# Patient Record
Sex: FEMALE | Race: WHITE | NOT HISPANIC OR LATINO | Employment: STUDENT | URBAN - METROPOLITAN AREA
[De-identification: names, ages, dates, MRNs, and addresses within clinical notes are randomized per-mention and may not be internally consistent; named-entity substitution may affect disease eponyms.]

---
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
## 2021-04-03 ENCOUNTER — HEALTH MAINTENANCE LETTER (OUTPATIENT)
Age: 21
End: 2021-04-03

## 2021-09-18 ENCOUNTER — HEALTH MAINTENANCE LETTER (OUTPATIENT)
Age: 21
End: 2021-09-18

## 2022-04-30 ENCOUNTER — HEALTH MAINTENANCE LETTER (OUTPATIENT)
Age: 22
End: 2022-04-30

## 2022-06-21 ENCOUNTER — TELEPHONE (OUTPATIENT)
Dept: FAMILY MEDICINE | Facility: CLINIC | Age: 22
End: 2022-06-21

## 2022-06-21 NOTE — TELEPHONE ENCOUNTER
I guess I do not know this patient.  Maybe triage need to evaluate what is going on.    If this is something that can wait till July, then please check the available schedule for any provider who can accommodate the patient.

## 2022-06-21 NOTE — TELEPHONE ENCOUNTER
Pt's mother, Barbara, calling to ask for an appointment for a medication check for pt. Pt has never been seen at [facility] and is transitioning from [facility]. Pt's mother is requesting July 6th (anytime) because it is the only day that pt will be [place]-town. Pt prefers to see a female provider.     Routing to female providers to ask if ok to use Same-Day slot or Hosp f/u slot to see pt.    Team to call pt's mother back after provider advises.   Ph: [phone], ok to leave detailed message     Sussy Childs,  [facility]

## 2022-06-22 NOTE — TELEPHONE ENCOUNTER
Called pt's mother, Barbara, to make an appt to establish care/physical for pt on 7/6/22. No answer, left voice message for Barbara to return call.    Sussy Childs,  [facility]

## 2022-06-22 NOTE — TELEPHONE ENCOUNTER
Called pt's mother, Barbara, to make appt for pt. Annual physical appt scheduled for Wednesday 7/6/22 at 10:40am (check-in) with Adelaida Flaherty in same-day slot.    Sussy Childs,  [facility]

## 2022-06-22 NOTE — TELEPHONE ENCOUNTER
Please call the patient and schedule Annual physical / Establish care which she is due at this time, to further take care of her medical conditions and medications.     Thank you,  Richelle Huff MD on 6/21/2022

## 2022-06-22 NOTE — TELEPHONE ENCOUNTER
Ok to use same day slot with any female provider on 7/6/2022 for annual physical/establish care visit.

## 2022-07-06 ENCOUNTER — OFFICE VISIT (OUTPATIENT)
Dept: FAMILY MEDICINE | Facility: CLINIC | Age: 22
End: 2022-07-06
Payer: COMMERCIAL

## 2022-07-06 VITALS
RESPIRATION RATE: 16 BRPM | BODY MASS INDEX: 22.6 KG/M2 | HEART RATE: 68 BPM | TEMPERATURE: 97.5 F | OXYGEN SATURATION: 97 % | WEIGHT: 144 LBS | SYSTOLIC BLOOD PRESSURE: 109 MMHG | DIASTOLIC BLOOD PRESSURE: 72 MMHG | HEIGHT: 67 IN

## 2022-07-06 DIAGNOSIS — Z12.4 CERVICAL CANCER SCREENING: ICD-10-CM

## 2022-07-06 DIAGNOSIS — Z11.3 SCREENING FOR STDS (SEXUALLY TRANSMITTED DISEASES): ICD-10-CM

## 2022-07-06 DIAGNOSIS — Z00.00 ROUTINE GENERAL MEDICAL EXAMINATION AT A HEALTH CARE FACILITY: Primary | ICD-10-CM

## 2022-07-06 DIAGNOSIS — B96.89 BV (BACTERIAL VAGINOSIS): ICD-10-CM

## 2022-07-06 DIAGNOSIS — N76.0 BV (BACTERIAL VAGINOSIS): ICD-10-CM

## 2022-07-06 DIAGNOSIS — F41.1 GAD (GENERALIZED ANXIETY DISORDER): ICD-10-CM

## 2022-07-06 DIAGNOSIS — Z29.89 ENCOUNTER FOR ALTITUDE SICKNESS PROPHYLAXIS: ICD-10-CM

## 2022-07-06 PROBLEM — F41.9 ANXIETY: Status: ACTIVE | Noted: 2018-07-05

## 2022-07-06 LAB
CLUE CELLS: PRESENT
TRICHOMONAS, WET PREP: ABNORMAL
WBC'S/HIGH POWER FIELD, WET PREP: ABNORMAL
YEAST, WET PREP: ABNORMAL

## 2022-07-06 PROCEDURE — 87210 SMEAR WET MOUNT SALINE/INK: CPT | Performed by: PHYSICIAN ASSISTANT

## 2022-07-06 PROCEDURE — 86803 HEPATITIS C AB TEST: CPT | Performed by: PHYSICIAN ASSISTANT

## 2022-07-06 PROCEDURE — 99213 OFFICE O/P EST LOW 20 MIN: CPT | Mod: 25 | Performed by: PHYSICIAN ASSISTANT

## 2022-07-06 PROCEDURE — 36415 COLL VENOUS BLD VENIPUNCTURE: CPT | Performed by: PHYSICIAN ASSISTANT

## 2022-07-06 PROCEDURE — G0124 SCREEN C/V THIN LAYER BY MD: HCPCS | Performed by: PATHOLOGY

## 2022-07-06 PROCEDURE — G0145 SCR C/V CYTO,THINLAYER,RESCR: HCPCS | Performed by: PHYSICIAN ASSISTANT

## 2022-07-06 PROCEDURE — 87389 HIV-1 AG W/HIV-1&-2 AB AG IA: CPT | Performed by: PHYSICIAN ASSISTANT

## 2022-07-06 PROCEDURE — 86780 TREPONEMA PALLIDUM: CPT | Performed by: PHYSICIAN ASSISTANT

## 2022-07-06 PROCEDURE — 87491 CHLMYD TRACH DNA AMP PROBE: CPT | Performed by: PHYSICIAN ASSISTANT

## 2022-07-06 PROCEDURE — 99385 PREV VISIT NEW AGE 18-39: CPT | Performed by: PHYSICIAN ASSISTANT

## 2022-07-06 PROCEDURE — 87591 N.GONORRHOEAE DNA AMP PROB: CPT | Performed by: PHYSICIAN ASSISTANT

## 2022-07-06 RX ORDER — ONDANSETRON 4 MG/1
4 TABLET, FILM COATED ORAL EVERY 6 HOURS PRN
COMMUNITY
Start: 2022-05-07 | End: 2022-07-06

## 2022-07-06 RX ORDER — ONDANSETRON 4 MG/1
4 TABLET, FILM COATED ORAL EVERY 6 HOURS PRN
Qty: 30 TABLET | Refills: 1 | Status: SHIPPED | OUTPATIENT
Start: 2022-07-06 | End: 2022-08-30

## 2022-07-06 RX ORDER — LEVONORGESTREL 19.5 MG/1
INTRAUTERINE DEVICE INTRAUTERINE
COMMUNITY
Start: 2020-08-10 | End: 2023-03-29

## 2022-07-06 RX ORDER — METRONIDAZOLE 7.5 MG/G
GEL VAGINAL
Qty: 70 G | Refills: 0 | Status: SHIPPED | OUTPATIENT
Start: 2022-07-06 | End: 2022-07-10

## 2022-07-06 RX ORDER — HYDROXYZINE HYDROCHLORIDE 10 MG/1
10 TABLET, FILM COATED ORAL 3 TIMES DAILY PRN
COMMUNITY
Start: 2021-09-15

## 2022-07-06 RX ORDER — AMITRIPTYLINE HYDROCHLORIDE 10 MG/1
10 TABLET ORAL AT BEDTIME
COMMUNITY
Start: 2021-12-07 | End: 2023-03-31

## 2022-07-06 ASSESSMENT — ENCOUNTER SYMPTOMS
HEMATURIA: 0
BREAST MASS: 0
PALPITATIONS: 0
ARTHRALGIAS: 0
COUGH: 0
CHILLS: 0
CONSTIPATION: 0
NERVOUS/ANXIOUS: 0
ABDOMINAL PAIN: 0
DIZZINESS: 0
HEADACHES: 0
FEVER: 0
SORE THROAT: 0
HEARTBURN: 0
EYE PAIN: 0
PARESTHESIAS: 0
SHORTNESS OF BREATH: 0
WEAKNESS: 0
MYALGIAS: 0
DYSURIA: 0
HEMATOCHEZIA: 0
NAUSEA: 1
FREQUENCY: 0
DIARRHEA: 0
JOINT SWELLING: 0

## 2022-07-06 ASSESSMENT — PAIN SCALES - GENERAL: PAINLEVEL: NO PAIN (0)

## 2022-07-06 NOTE — PROGRESS NOTES
SUBJECTIVE:   CC: Evelin Thurman is an 22 year old woman who presents for preventive health visit.       Patient has been advised of split billing requirements and indicates understanding: Yes  Healthy Habits:     Getting at least 3 servings of Calcium per day:  Yes    Bi-annual eye exam:  NO    Dental care twice a year:  NO    Sleep apnea or symptoms of sleep apnea:  Daytime drowsiness    Diet:  Vegetarian/vegan    Frequency of exercise:  2-3 days/week    Duration of exercise:  45-60 minutes    Taking medications regularly:  Yes    Medication side effects:  None    PHQ-2 Total Score: 2    Additional concerns today:  No    - Requests refills of Zofran. Has international travel coming up and gets altitude sickness, Zofran helps  - Needs refills of fluoxetine, SEN stable  - Treated for BV several months ago and has had intermittent spotting since. Patient has light periods 2/2 IUD, but has been spotting between menses.    Today's PHQ-2 Score:   PHQ-2 ( 1999 Pfizer) 7/6/2022   Q1: Little interest or pleasure in doing things 1   Q2: Feeling down, depressed or hopeless 1   PHQ-2 Score 2   Q1: Little interest or pleasure in doing things Several days   Q2: Feeling down, depressed or hopeless Several days   PHQ-2 Score 2       Abuse: Current or Past (Physical, Sexual or Emotional) - No  Do you feel safe in your environment? Yes    Have you ever done Advance Care Planning? (For example, a Health Directive, POLST, or a discussion with a medical provider or your loved ones about your wishes): No, advance care planning information given to patient to review.  Patient declined advance care planning discussion at this time.    Social History     Tobacco Use     Smoking status: Never Smoker     Smokeless tobacco: Never Used   Substance Use Topics     Alcohol use: Never         Alcohol Use 7/6/2022   Prescreen: >3 drinks/day or >7 drinks/week? No     Reviewed orders with patient.  Reviewed health maintenance and updated orders  accordingly - Yes  BP Readings from Last 3 Encounters:   07/06/22 109/72    Wt Readings from Last 3 Encounters:   07/06/22 65.3 kg (144 lb)                  Patient Active Problem List   Diagnosis     Anxiety     History reviewed. No pertinent surgical history.    Social History     Tobacco Use     Smoking status: Never Smoker     Smokeless tobacco: Never Used   Substance Use Topics     Alcohol use: Never     Family History   Problem Relation Age of Onset     No Known Problems Mother      No Known Problems Father      Diabetes Paternal Grandfather      Brain Cancer Cousin            Breast Cancer Screening:        History of abnormal Pap smear: NO - age 21-29 PAP every 3 years recommended     Reviewed and updated as needed this visit by clinical staff   Tobacco  Allergies  Meds  Problems  Med Hx  Surg Hx  Fam Hx            Reviewed and updated as needed this visit by Provider   Tobacco  Allergies  Meds  Problems  Med Hx  Surg Hx  Fam Hx               Review of Systems   Constitutional: Negative for chills and fever.   HENT: Negative for congestion, ear pain, hearing loss and sore throat.    Eyes: Negative for pain and visual disturbance.   Respiratory: Negative for cough and shortness of breath.    Cardiovascular: Negative for chest pain, palpitations and peripheral edema.   Gastrointestinal: Positive for nausea. Negative for abdominal pain, constipation, diarrhea, heartburn and hematochezia.   Breasts:  Negative for tenderness, breast mass and discharge.   Genitourinary: Positive for vaginal bleeding and vaginal discharge. Negative for dysuria, frequency, genital sores, hematuria, pelvic pain and urgency.   Musculoskeletal: Negative for arthralgias, joint swelling and myalgias.   Skin: Negative for rash.   Neurological: Negative for dizziness, weakness, headaches and paresthesias.   Psychiatric/Behavioral: Negative for mood changes. The patient is not nervous/anxious.           OBJECTIVE:   /72   " Pulse 68   Temp 97.5  F (36.4  C) (Tympanic)   Resp 16   Ht 1.695 m (5' 6.73\")   Wt 65.3 kg (144 lb)   SpO2 97%   BMI 22.74 kg/m    Physical Exam  GENERAL: healthy, alert and no distress  EYES: Eyes grossly normal to inspection, PERRL and conjunctivae and sclerae normal  HENT: ear canals and TM's normal, nose and mouth without ulcers or lesions  NECK: no adenopathy, no asymmetry, masses, or scars and thyroid normal to palpation  RESP: lungs clear to auscultation - no rales, rhonchi or wheezes  BREAST: normal without masses, tenderness or nipple discharge and no palpable axillary masses or adenopathy  CV: regular rate and rhythm, normal S1 S2, no S3 or S4, no murmur, click or rub, no peripheral edema and peripheral pulses strong   (female): normal female external genitalia, normal urethral meatus, vaginal mucosa pink, moist, well rugated, and normal cervix/adnexa/uterus without masses or discharge. IUD strings visible at os.  MS: no gross musculoskeletal defects noted, no edema  SKIN: no suspicious lesions or rashes  NEURO: Normal strength and tone, mentation intact and speech normal  PSYCH: mentation appears normal, affect normal/bright    Diagnostic Test Results:  Results for orders placed or performed in visit on 07/06/22 (from the past 24 hour(s))   Wet prep - Clinic Collect    Specimen: Vagina; Swab   Result Value Ref Range    Trichomonas Absent Absent    Yeast Absent Absent    Clue Cells Present (A) Absent    WBCs/high power field None None       ASSESSMENT/PLAN:       ICD-10-CM    1. Routine general medical examination at a health care facility  Z00.00    2. Encounter for altitude sickness prophylaxis  Z29.8 ondansetron (ZOFRAN) 4 MG tablet   3. BV (bacterial vaginosis)  N76.0 metroNIDAZOLE (METROGEL) 0.75 % vaginal gel    B96.89    4. SEN (generalized anxiety disorder)  F41.1 FLUoxetine (PROZAC) 20 MG capsule   5. Screening for STDs (sexually transmitted diseases)  Z11.3 NEISSERIA GONORRHOEA PCR     " "CHLAMYDIA TRACHOMATIS PCR     HIV Antigen Antibody Combo     Hepatitis C Screen Reflex to HCV RNA Quant and Genotype     Treponema Abs w Reflex to RPR and Titer     Wet prep - Clinic Collect     HIV Antigen Antibody Combo     Hepatitis C Screen Reflex to HCV RNA Quant and Genotype     Treponema Abs w Reflex to RPR and Titer   6. Cervical cancer screening  Z12.4 Pap Screen only - recommended age 21 - 24 years     - Zofran and fluoxetine refilled as requested.  - Wet prep positive for BV, rx for Metrogel sent to patient's pharmacy.    COUNSELING:  Reviewed preventive health counseling, as reflected in patient instructions    Estimated body mass index is 22.74 kg/m  as calculated from the following:    Height as of this encounter: 1.695 m (5' 6.73\").    Weight as of this encounter: 65.3 kg (144 lb).        She reports that she has never smoked. She has never used smokeless tobacco.      Counseling Resources:  ATP IV Guidelines  Pooled Cohorts Equation Calculator  Breast Cancer Risk Calculator  BRCA-Related Cancer Risk Assessment: FHS-7 Tool  FRAX Risk Assessment  ICSI Preventive Guidelines  Dietary Guidelines for Americans, 2010  USDA's MyPlate  ASA Prophylaxis  Lung CA Screening    CLAYTON Champion [facility]  "

## 2022-07-07 LAB
C TRACH DNA SPEC QL NAA+PROBE: NEGATIVE
HCV AB SERPL QL IA: NONREACTIVE
HIV 1+2 AB+HIV1 P24 AG SERPL QL IA: NONREACTIVE
N GONORRHOEA DNA SPEC QL NAA+PROBE: NEGATIVE
T PALLIDUM AB SER QL: NONREACTIVE

## 2022-07-11 LAB
BKR LAB AP GYN ADEQUACY: ABNORMAL
BKR LAB AP GYN INTERPRETATION: ABNORMAL
BKR LAB AP HPV REFLEX: NO
BKR LAB AP PREVIOUS ABNORMAL: ABNORMAL
PATH REPORT.COMMENTS IMP SPEC: ABNORMAL
PATH REPORT.COMMENTS IMP SPEC: ABNORMAL
PATH REPORT.RELEVANT HX SPEC: ABNORMAL

## 2022-07-12 ENCOUNTER — PATIENT OUTREACH (OUTPATIENT)
Dept: FAMILY MEDICINE | Facility: CLINIC | Age: 22
End: 2022-07-12

## 2022-07-18 ENCOUNTER — TELEPHONE (OUTPATIENT)
Dept: FAMILY MEDICINE | Facility: CLINIC | Age: 22
End: 2022-07-18

## 2022-07-18 NOTE — TELEPHONE ENCOUNTER
Patient Contact    Attempt # 1    Was call answered?  No.  Left message on voicemail with information to call triage back at [phone], option 2.  Info also left in my chart message    On call back:     Triage pt for my chart sx.    Jeison Son,   Ever since I started the vaginal insert gel for BV I have been having very strange, almost solid discharge. Similar to cottage cheese but more solid and drier. I am wondering if this is normal or if I should be concerned? I also appear to have a UTI, not sure whether that could be related or not. Please let me know your thoughts when you get the chance. Thanks, Evelin RIVERA RN  EP Triage

## 2022-11-19 ENCOUNTER — HEALTH MAINTENANCE LETTER (OUTPATIENT)
Age: 22
End: 2022-11-19

## 2022-12-06 DIAGNOSIS — F41.1 GAD (GENERALIZED ANXIETY DISORDER): ICD-10-CM

## 2022-12-08 NOTE — TELEPHONE ENCOUNTER
Routing refill request to provider for review/approval because:  Drug interaction warning    Rodolfo Murphy RN  [facility] Triage Nurse

## 2023-03-29 PROBLEM — B00.9 HERPES SIMPLEX: Status: ACTIVE | Noted: 2020-08-21

## 2023-03-29 PROBLEM — Z97.5 PRESENCE OF INTRAUTERINE CONTRACEPTIVE DEVICE: Status: ACTIVE | Noted: 2020-04-10

## 2023-03-30 ENCOUNTER — VIRTUAL VISIT (OUTPATIENT)
Dept: INTERNAL MEDICINE | Facility: CLINIC | Age: 23
End: 2023-03-30
Payer: COMMERCIAL

## 2023-03-30 DIAGNOSIS — Z53.9 NO SHOW: Primary | ICD-10-CM

## 2023-03-30 NOTE — PROGRESS NOTES
MA staff unable to get ahold of patient during rooming process but did get ahold of patient's mother who reported to MA that patient is still sleeping and usually sleeps until noon (appoint is scheduled for 11:10 arrival time). Link sent at 11:10 without response. I called patient at 11:21 and no answer. I left  asking her to reschedule. Will sharon as no show to this visit. Patient would likely benefit from scheduling afternoon appointments in the future.    Logan Aguero MD, MPH  [facility]  Internal Medicine

## 2023-03-31 ENCOUNTER — VIRTUAL VISIT (OUTPATIENT)
Dept: FAMILY MEDICINE | Facility: CLINIC | Age: 23
End: 2023-03-31
Payer: COMMERCIAL

## 2023-03-31 DIAGNOSIS — F41.1 GAD (GENERALIZED ANXIETY DISORDER): ICD-10-CM

## 2023-03-31 DIAGNOSIS — R11.0 NAUSEA: Primary | ICD-10-CM

## 2023-03-31 PROBLEM — F41.9 ANXIETY: Status: RESOLVED | Noted: 2018-07-05 | Resolved: 2023-03-31

## 2023-03-31 PROCEDURE — 99214 OFFICE O/P EST MOD 30 MIN: CPT | Mod: VID | Performed by: INTERNAL MEDICINE

## 2023-03-31 RX ORDER — FLUOXETINE 40 MG/1
40 CAPSULE ORAL DAILY
Qty: 90 CAPSULE | Refills: 0 | Status: SHIPPED | OUTPATIENT
Start: 2023-03-31 | End: 2023-07-03

## 2023-03-31 RX ORDER — ONDANSETRON 4 MG/1
TABLET, FILM COATED ORAL
Qty: 30 TABLET | Refills: 0 | Status: SHIPPED | OUTPATIENT
Start: 2023-03-31 | End: 2023-05-22

## 2023-03-31 ASSESSMENT — PATIENT HEALTH QUESTIONNAIRE - PHQ9
SUM OF ALL RESPONSES TO PHQ QUESTIONS 1-9: 13
SUM OF ALL RESPONSES TO PHQ QUESTIONS 1-9: 13
10. IF YOU CHECKED OFF ANY PROBLEMS, HOW DIFFICULT HAVE THESE PROBLEMS MADE IT FOR YOU TO DO YOUR WORK, TAKE CARE OF THINGS AT HOME, OR GET ALONG WITH OTHER PEOPLE: SOMEWHAT DIFFICULT

## 2023-03-31 ASSESSMENT — ANXIETY QUESTIONNAIRES
3. WORRYING TOO MUCH ABOUT DIFFERENT THINGS: MORE THAN HALF THE DAYS
2. NOT BEING ABLE TO STOP OR CONTROL WORRYING: MORE THAN HALF THE DAYS
4. TROUBLE RELAXING: MORE THAN HALF THE DAYS
5. BEING SO RESTLESS THAT IT IS HARD TO SIT STILL: NOT AT ALL
6. BECOMING EASILY ANNOYED OR IRRITABLE: MORE THAN HALF THE DAYS
GAD7 TOTAL SCORE: 13
7. FEELING AFRAID AS IF SOMETHING AWFUL MIGHT HAPPEN: MORE THAN HALF THE DAYS
GAD7 TOTAL SCORE: 13
GAD7 TOTAL SCORE: 13
1. FEELING NERVOUS, ANXIOUS, OR ON EDGE: NEARLY EVERY DAY
7. FEELING AFRAID AS IF SOMETHING AWFUL MIGHT HAPPEN: MORE THAN HALF THE DAYS
8. IF YOU CHECKED OFF ANY PROBLEMS, HOW DIFFICULT HAVE THESE MADE IT FOR YOU TO DO YOUR WORK, TAKE CARE OF THINGS AT HOME, OR GET ALONG WITH OTHER PEOPLE?: SOMEWHAT DIFFICULT
IF YOU CHECKED OFF ANY PROBLEMS ON THIS QUESTIONNAIRE, HOW DIFFICULT HAVE THESE PROBLEMS MADE IT FOR YOU TO DO YOUR WORK, TAKE CARE OF THINGS AT HOME, OR GET ALONG WITH OTHER PEOPLE: SOMEWHAT DIFFICULT

## 2023-03-31 NOTE — PROGRESS NOTES
Evelin is a 23 year old who is being evaluated via a billable video visit.      How would you like to obtain your AVS? MyChart  If the video visit is dropped, the invitation should be resent by: Text to cell phone: [phone]  Will anyone else be joining your video visit? No        Assessment & Plan     1.  Generalized anxiety disorder with increased symptoms.  Discussed treatment option which include increasing dose of fluoxetine versus trying a new SSRI.  After discussion agreed to increase fluoxetine from 20 mg once a day to 40 mg daily.  Advised her that she establish care with a primary provider close to where she lives.  2.  Nausea with severe anxiety.  Patient requested refill of Zofran which I agreed to provide 30 tablets.             Depression Screening Follow Up    PHQ 3/31/2023   PHQ-9 Total Score 13   Q9: Thoughts of better off dead/self-harm past 2 weeks Not at all           Gera Joseph MD  [facility]   Evelin is a 23 year old, presenting for the following health issues:  No chief complaint on file.    Additional Questions 3/31/2023   Roomed by Hardy     History of Present Illness       Mental Health Follow-up:  Patient presents to follow-up on Anxiety.    Patient's anxiety since last visit has been:  Worse  The patient is not having other symptoms associated with anxiety.  Any significant life events: No  Patient is feeling anxious or having panic attacks.  Patient has no concerns about alcohol or drug use.     Today's PHQ-9         PHQ-9 Total Score: 13    PHQ-9 Q9 Thoughts of better off dead/self-harm past 2 weeks :   Not at all    How difficult have these problems made it for you to do your work, take care of things at home, or get along with other people: Somewhat difficult  Today's SEN-7 Score: 13       Medication Followup of Fluoxtine    Taking Medication as prescribed: yes    Side Effects:  None    Medication Helping Symptoms:  NO    23-year-old young   has been having increased generalized anxiety for the past few months.  She has a long history of anxiety disorder and being on fluoxetine 20 mg a day since age 17.  When questioned about depression she indicated that her mood is probably little low as well.  When she gets severe anxiety at times she will get nausea as well and has taken Zofran on a as needed basis.  She is requesting a refill of Zofran.  Currently she does not have an established PCP.        She graduated from from college with environmental science and [employer] last year.  She then went on a backpacking trip to several countries.  She is starting a new job in June.      Review of Systems   Constitutional, HEENT, cardiovascular, pulmonary, gi and gu systems are negative, except as otherwise noted.      Objective           Vitals:  No vitals were obtained today due to virtual visit.    Physical Exam   GENERAL: Healthy, alert and no distress  EYES: Eyes grossly normal to inspection.  No discharge or erythema, or obvious scleral/conjunctival abnormalities.  RESP: No audible wheeze, cough, or visible cyanosis.  No visible retractions or increased work of breathing.    SKIN: Visible skin clear. No significant rash, abnormal pigmentation or lesions.  NEURO: Cranial nerves grossly intact.  Mentation and speech appropriate for age.  PSYCH: Mentation appears normal, affect normal/bright, judgement and insight intact, normal speech and appearance well-groomed.                Video-Visit Details    Type of service:  Video Visit     Originating Location (pt. Location): Home  Distant Location (provider location):  On-site  Platform used for Video Visit: Jess

## 2023-05-24 ENCOUNTER — VIRTUAL VISIT (OUTPATIENT)
Dept: FAMILY MEDICINE | Facility: CLINIC | Age: 23
End: 2023-05-24
Payer: COMMERCIAL

## 2023-05-24 DIAGNOSIS — G44.219 EPISODIC TENSION-TYPE HEADACHE, NOT INTRACTABLE: ICD-10-CM

## 2023-05-24 DIAGNOSIS — R41.840 ATTENTION DEFICIT: Primary | ICD-10-CM

## 2023-05-24 PROCEDURE — 99214 OFFICE O/P EST MOD 30 MIN: CPT | Mod: VID | Performed by: PHYSICIAN ASSISTANT

## 2023-05-24 RX ORDER — METHOCARBAMOL 500 MG/1
500 TABLET, FILM COATED ORAL 3 TIMES DAILY PRN
Qty: 45 TABLET | Refills: 1 | Status: SHIPPED | OUTPATIENT
Start: 2023-05-24 | End: 2024-07-29

## 2023-06-06 ENCOUNTER — PATIENT OUTREACH (OUTPATIENT)
Dept: CARE COORDINATION | Facility: CLINIC | Age: 23
End: 2023-06-06
Payer: COMMERCIAL

## 2023-06-20 ENCOUNTER — PATIENT OUTREACH (OUTPATIENT)
Dept: FAMILY MEDICINE | Facility: CLINIC | Age: 23
End: 2023-06-20
Payer: COMMERCIAL

## 2023-06-20 ENCOUNTER — PATIENT OUTREACH (OUTPATIENT)
Dept: CARE COORDINATION | Facility: CLINIC | Age: 23
End: 2023-06-20
Payer: COMMERCIAL

## 2023-06-20 DIAGNOSIS — R87.610 ATYPICAL SQUAMOUS CELLS OF UNDETERMINED SIGNIFICANCE (ASCUS) ON PAPANICOLAOU SMEAR OF CERVIX: ICD-10-CM

## 2023-06-29 DIAGNOSIS — F41.1 GAD (GENERALIZED ANXIETY DISORDER): ICD-10-CM

## 2023-07-03 RX ORDER — FLUOXETINE 40 MG/1
CAPSULE ORAL
Qty: 90 CAPSULE | Refills: 0 | Status: SHIPPED | OUTPATIENT
Start: 2023-07-03 | End: 2023-10-25

## 2023-09-10 ENCOUNTER — HEALTH MAINTENANCE LETTER (OUTPATIENT)
Age: 23
End: 2023-09-10

## 2023-10-25 ENCOUNTER — OFFICE VISIT (OUTPATIENT)
Dept: FAMILY MEDICINE | Facility: CLINIC | Age: 23
End: 2023-10-25
Payer: COMMERCIAL

## 2023-10-25 VITALS
BODY MASS INDEX: 23.88 KG/M2 | WEIGHT: 148.6 LBS | DIASTOLIC BLOOD PRESSURE: 75 MMHG | TEMPERATURE: 97.7 F | RESPIRATION RATE: 17 BRPM | HEART RATE: 70 BPM | SYSTOLIC BLOOD PRESSURE: 114 MMHG | OXYGEN SATURATION: 99 % | HEIGHT: 66 IN

## 2023-10-25 DIAGNOSIS — Z00.00 ROUTINE HISTORY AND PHYSICAL EXAMINATION OF ADULT: Primary | ICD-10-CM

## 2023-10-25 DIAGNOSIS — Z23 ENCOUNTER FOR IMMUNIZATION: ICD-10-CM

## 2023-10-25 DIAGNOSIS — Z11.3 SCREENING FOR STDS (SEXUALLY TRANSMITTED DISEASES): ICD-10-CM

## 2023-10-25 DIAGNOSIS — R11.0 NAUSEA: ICD-10-CM

## 2023-10-25 DIAGNOSIS — Z12.4 CERVICAL CANCER SCREENING: ICD-10-CM

## 2023-10-25 DIAGNOSIS — Z13.6 CARDIOVASCULAR SCREENING; LDL GOAL LESS THAN 160: ICD-10-CM

## 2023-10-25 DIAGNOSIS — Z13.1 SCREENING FOR DIABETES MELLITUS: ICD-10-CM

## 2023-10-25 DIAGNOSIS — F41.1 GAD (GENERALIZED ANXIETY DISORDER): ICD-10-CM

## 2023-10-25 LAB
ALBUMIN SERPL BCG-MCNC: 4.6 G/DL (ref 3.5–5.2)
ALP SERPL-CCNC: 67 U/L (ref 35–104)
ALT SERPL W P-5'-P-CCNC: 21 U/L (ref 0–50)
ANION GAP SERPL CALCULATED.3IONS-SCNC: 9 MMOL/L (ref 7–15)
AST SERPL W P-5'-P-CCNC: 24 U/L (ref 0–45)
BILIRUB SERPL-MCNC: 0.5 MG/DL
BUN SERPL-MCNC: 14.5 MG/DL (ref 6–20)
C TRACH DNA SPEC QL NAA+PROBE: NEGATIVE
CALCIUM SERPL-MCNC: 9.8 MG/DL (ref 8.6–10)
CHLORIDE SERPL-SCNC: 102 MMOL/L (ref 98–107)
CHOLEST SERPL-MCNC: 159 MG/DL
CREAT SERPL-MCNC: 0.71 MG/DL (ref 0.51–0.95)
DEPRECATED HCO3 PLAS-SCNC: 27 MMOL/L (ref 22–29)
EGFRCR SERPLBLD CKD-EPI 2021: >90 ML/MIN/1.73M2
GLUCOSE SERPL-MCNC: 69 MG/DL (ref 70–99)
HDLC SERPL-MCNC: 56 MG/DL
LDLC SERPL CALC-MCNC: 68 MG/DL
N GONORRHOEA DNA SPEC QL NAA+PROBE: NEGATIVE
NONHDLC SERPL-MCNC: 103 MG/DL
POTASSIUM SERPL-SCNC: 4.3 MMOL/L (ref 3.4–5.3)
PROT SERPL-MCNC: 7.5 G/DL (ref 6.4–8.3)
SODIUM SERPL-SCNC: 138 MMOL/L (ref 135–145)
TRIGL SERPL-MCNC: 173 MG/DL

## 2023-10-25 PROCEDURE — 87491 CHLMYD TRACH DNA AMP PROBE: CPT | Performed by: FAMILY MEDICINE

## 2023-10-25 PROCEDURE — 90480 ADMN SARSCOV2 VAC 1/ONLY CMP: CPT | Performed by: FAMILY MEDICINE

## 2023-10-25 PROCEDURE — 80061 LIPID PANEL: CPT | Performed by: FAMILY MEDICINE

## 2023-10-25 PROCEDURE — 90471 IMMUNIZATION ADMIN: CPT | Performed by: FAMILY MEDICINE

## 2023-10-25 PROCEDURE — G0145 SCR C/V CYTO,THINLAYER,RESCR: HCPCS | Performed by: FAMILY MEDICINE

## 2023-10-25 PROCEDURE — 99395 PREV VISIT EST AGE 18-39: CPT | Mod: 25 | Performed by: FAMILY MEDICINE

## 2023-10-25 PROCEDURE — 87591 N.GONORRHOEAE DNA AMP PROB: CPT | Performed by: FAMILY MEDICINE

## 2023-10-25 PROCEDURE — 80053 COMPREHEN METABOLIC PANEL: CPT | Performed by: FAMILY MEDICINE

## 2023-10-25 PROCEDURE — 90686 IIV4 VACC NO PRSV 0.5 ML IM: CPT | Performed by: FAMILY MEDICINE

## 2023-10-25 PROCEDURE — 91320 SARSCV2 VAC 30MCG TRS-SUC IM: CPT | Performed by: FAMILY MEDICINE

## 2023-10-25 PROCEDURE — 36415 COLL VENOUS BLD VENIPUNCTURE: CPT | Performed by: FAMILY MEDICINE

## 2023-10-25 RX ORDER — FLUOXETINE 40 MG/1
40 CAPSULE ORAL DAILY
Qty: 90 CAPSULE | Refills: 3 | Status: SHIPPED | OUTPATIENT
Start: 2023-10-25 | End: 2024-07-29

## 2023-10-25 RX ORDER — ONDANSETRON 4 MG/1
TABLET, FILM COATED ORAL
Qty: 30 TABLET | Refills: 5 | Status: SHIPPED | OUTPATIENT
Start: 2023-10-25 | End: 2024-07-29

## 2023-10-25 ASSESSMENT — ENCOUNTER SYMPTOMS
FEVER: 0
HEADACHES: 0
ARTHRALGIAS: 0
DIARRHEA: 0
SHORTNESS OF BREATH: 0
ABDOMINAL PAIN: 0
PALPITATIONS: 0
CONSTIPATION: 0
HEARTBURN: 0
PARESTHESIAS: 0
FREQUENCY: 0
SORE THROAT: 0
MYALGIAS: 0
HEMATOCHEZIA: 0
DIZZINESS: 0
DYSURIA: 0
EYE PAIN: 0
NAUSEA: 0
HEMATURIA: 0
JOINT SWELLING: 0
WEAKNESS: 0
NERVOUS/ANXIOUS: 0
COUGH: 0
CHILLS: 0

## 2023-10-25 ASSESSMENT — PAIN SCALES - GENERAL: PAINLEVEL: NO PAIN (0)

## 2023-10-25 NOTE — PROGRESS NOTES
SUBJECTIVE:   CC: Evelin is an 23 year old who presents for preventive health visit.       10/25/2023     9:57 AM   Additional Questions   Roomed by Hayde   Accompanied by self       Healthy Habits:     Getting at least 3 servings of Calcium per day:  Yes    Bi-annual eye exam:  NO    Dental care twice a year:  NO    Sleep apnea or symptoms of sleep apnea:  Daytime drowsiness    Diet:  Other    Frequency of exercise:  2-3 days/week    Duration of exercise:  45-60 minutes    Taking medications regularly:  Yes    Medication side effects:  None    Additional concerns today:  Yes        Social History     Tobacco Use    Smoking status: Never    Smokeless tobacco: Never   Substance Use Topics    Alcohol use: Never             10/25/2023     9:56 AM   Alcohol Use   Prescreen: >3 drinks/day or >7 drinks/week? No          No data to display              Reviewed orders with patient.  Reviewed health maintenance and updated orders accordingly - Yes  Lab work is in process  Labs reviewed in EPIC  BP Readings from Last 3 Encounters:   10/25/23 114/75   07/06/22 109/72    Wt Readings from Last 3 Encounters:   10/25/23 67.4 kg (148 lb 9.6 oz)   07/06/22 65.3 kg (144 lb)                  Patient Active Problem List   Diagnosis    Atypical squamous cells of undetermined significance (ASCUS) on Papanicolaou smear of cervix    Herpes simplex    Presence of intrauterine contraceptive device    SEN (generalized anxiety disorder)     No past surgical history on file.    Social History     Tobacco Use    Smoking status: Never    Smokeless tobacco: Never   Substance Use Topics    Alcohol use: Never     Family History   Problem Relation Age of Onset    No Known Problems Mother     No Known Problems Father     Diabetes Paternal Grandfather     Brain Cancer Cousin          Current Outpatient Medications   Medication Sig Dispense Refill    FLUoxetine (PROZAC) 40 MG capsule Take 1 capsule (40 mg) by mouth daily 90 capsule 3    hydrOXYzine  (ATARAX) 10 MG tablet Take 10 mg by mouth 3 times daily as needed      levonorgestrel (MIRENA) 20 MCG/DAY IUD 1 Intra Uterine Device by Intrauterine route once      methocarbamol (ROBAXIN) 500 MG tablet Take 1 tablet (500 mg) by mouth 3 times daily as needed for other (tension headache) 45 tablet 1    ondansetron (ZOFRAN) 4 MG tablet TAKE 1 TABLET(4 MG) BY MOUTH EVERY 6 HOURS AS NEEDED FOR NAUSEA OR VOMITING 30 tablet 5     Allergies   Allergen Reactions    Dairy Digestive GI Disturbance and Other (See Comments)     Inflammation reaction to sinus       Breast Cancer Screening:        History of abnormal Pap smear: YES - other categories - see link Cervical Cytology Screening Guidelines      7/6/2022    11:13 AM   PAP / HPV   PAP Atypical squamous cells of undetermined significance (ASC-US)      Reviewed and updated as needed this visit by clinical staff   Tobacco  Allergies               Reviewed and updated as needed this visit by Provider                     Review of Systems   Constitutional:  Negative for chills and fever.   HENT:  Negative for congestion, ear pain, hearing loss and sore throat.    Eyes:  Negative for pain and visual disturbance.   Respiratory:  Negative for cough and shortness of breath.    Cardiovascular:  Negative for chest pain, palpitations and peripheral edema.   Gastrointestinal:  Negative for abdominal pain, constipation, diarrhea, heartburn, hematochezia and nausea.   Genitourinary:  Negative for dysuria, frequency, genital sores, hematuria and urgency.   Musculoskeletal:  Negative for arthralgias, joint swelling and myalgias.   Skin:  Negative for rash.   Neurological:  Negative for dizziness, weakness, headaches and paresthesias.   Psychiatric/Behavioral:  Negative for mood changes. The patient is not nervous/anxious.      CONSTITUTIONAL: NEGATIVE for fever, chills, change in weight  INTEGUMENTARU/SKIN: NEGATIVE for worrisome rashes, moles or lesions  EYES: NEGATIVE for vision  "changes or irritation  ENT: NEGATIVE for ear, mouth and throat problems  RESP: NEGATIVE for significant cough or SOB  BREAST: NEGATIVE for masses, tenderness or discharge  CV: NEGATIVE for chest pain, palpitations or peripheral edema  GI: NEGATIVE for nausea, abdominal pain, heartburn, or change in bowel habits  : NEGATIVE for unusual urinary or vaginal symptoms. Periods are regular.  MUSCULOSKELETAL: NEGATIVE for significant arthralgias or myalgia  NEURO: NEGATIVE for weakness, dizziness or paresthesias  PSYCHIATRIC: NEGATIVE for changes in mood or affect     OBJECTIVE:   /75 (BP Location: Left arm, Patient Position: Sitting, Cuff Size: Adult Regular)   Pulse 70   Temp 97.7  F (36.5  C) (Oral)   Resp 17   Ht 1.685 m (5' 6.34\")   Wt 67.4 kg (148 lb 9.6 oz)   SpO2 99%   BMI 23.74 kg/m    Physical Exam  GENERAL: healthy, alert and no distress  NECK: no adenopathy, no asymmetry, masses, or scars and thyroid normal to palpation  RESP: lungs clear to auscultation - no rales, rhonchi or wheezes  CV: regular rate and rhythm, normal S1 S2, no S3 or S4, no murmur, click or rub, no peripheral edema and peripheral pulses strong  ABDOMEN: soft, nontender, no hepatosplenomegaly, no masses and bowel sounds normal   (female): normal female external genitalia, normal urethral meatus, vaginal mucosa, normal cervix/adnexa/uterus without masses or discharge  MS: no gross musculoskeletal defects noted, no edema    Diagnostic Test Results:  Labs reviewed in Epic    ASSESSMENT/PLAN:   (Z00.00) Routine history and physical examination of adult  (primary encounter diagnosis)  Comment:   Plan: as below.    (Z13.6) CARDIOVASCULAR SCREENING; LDL GOAL LESS THAN 160  Comment:   Plan: Comprehensive metabolic panel (BMP + Alb, Alk         Phos, ALT, AST, Total. Bili, TP), Lipid panel         reflex to direct LDL Non-fasting            (Z13.1) Screening for diabetes mellitus  Comment:   Plan: Comprehensive metabolic panel (BMP + " Alb, Alk         Phos, ALT, AST, Total. Bili, TP)            (Z11.3) Screening for STDs (sexually transmitted diseases)  Comment:   Plan: NEISSERIA GONORRHOEA PCR, CHLAMYDIA TRACHOMATIS        PCR            (Z12.4) Cervical cancer screening  Comment:   Plan: Pap Screen only - recommended age 21 - 24 years            (F41.1) SEN (generalized anxiety disorder)  Comment:   Plan: FLUoxetine (PROZAC) 40 MG capsule            (R11.0) Nausea  Comment:   Plan: ondansetron (ZOFRAN) 4 MG tablet            (Z23) Encounter for immunization  Comment:   Plan: INFLUENZA VACCINE IM > 6 MONTHS VALENT IIV4         (AFLURIA/FLUZONE), COVID-19 12+ (2023-24)         (PFIZER)            Patient has been advised of split billing requirements and indicates understanding: Yes      COUNSELING:  Reviewed preventive health counseling, as reflected in patient instructions       Regular exercise       Healthy diet/nutrition       Vision screening        She reports that she has never smoked. She has never used smokeless tobacco.        Brett Waterman MD, MD  [facility]

## 2023-10-25 NOTE — COMMUNITY RESOURCES LIST (ENGLISH)
10/25/2023   [facility]  N/A  For questions about this resource list or additional care needs, please contact your primary care clinic or care manager.  Phone: [phone]   Email: N/A   Address: [address] [place] [id number]   Hours: N/A        Food and Nutrition       Food pantry  1  People Reaching Out to Other People (PROP) Distance: 2.5 miles      Pickup   [address] [place] [address]  Language: English, Burkinan  Hours: Mon - Tue 9:30 AM - 1:00 PM , Wed 3:00 PM - 6:30 PM , Thu - Fri 9:30 AM - 1:00 PM  Fees: Free   Phone: (826) 394-3685 Email: prop@[employer] Website: http://www.FSAstore.com.BT[employer] Payments     2  [facility] ([facility]) - K-Tel Distance: 4.82 miles      [facility], Pickup   [address] [place] [address]  Language: English, Surinamese, Burkinan  Hours: Mon 12:00 AM - 7:00 PM , Tue 10:00 AM - 3:00 PM , Wed - Thu 10:00 AM - 2:30 PM  Fees: Free   Phone: (700) 665-6834 Email: ica@[employer].org Website: http://www.[employer].org     SNAP application assistance  3  People Reaching Out to Other People (PROP) Distance: 2.5 miles      In-Person, Phone/Virtual   [address] [place] [address]  Language: English, Burkinan  Hours: Mon - Tue 9:30 AM - 1:00 PM , Wed 3:00 PM - 6:30 PM , Thu - Fri 9:30 AM - 1:00 PM  Fees: Free   Phone: (468) 441-8035 Email: prop@FSA[employer].com.org Website: http://www.FSAstore.com.org     4  [facility] Distance: 11.27 miles      In-Person, Phone/Virtual   [address] [place] [address]  Language: English, Burkinan  Hours: Mon - Fri 9:00 AM - 4:30 PM  Fees: Free   Phone: (230) 479-4960 Ext.113 Email: info@veap.org Website: https://Sosediap.org     Soup kitchen or free meals  5  [facility] Distance: 5.59 miles      [facility]   [address] [place] [address]  Language: English  Hours: Mon - Tue 5:30 PM - 6:30 PM , Sat -  Sun 5:30 PM - 6:30 PM  Fees: Free   Phone: (979) 320-8369 Email: office@[employer] Website: https://www.[employer]     6  [employer] Distance: 6.2 miles      In-Person   [address] [place] [address]  Language: English  Hours: Mon - Fri 2:30 PM - 5:00 PM  Fees: Free   Phone: (707) 469-3080 Email: info@[employer] Website: http://[employer]/          Important Numbers & Websites       Emergency Services   [address]  [place] [address]  Poison Control   (361) 922-2564  Suicide Prevention Lifeline   (144) 419-4363 (TALK)  Child Abuse Hotline   (686) 120-2926 (4-A-Child)  Sexual Assault Hotline   (786) 274-4127 (HOPE)  National Runaway Safeline   (858) 915-1767 (RUNAWAY)  All-Options Talkline   (927) 469-5898  Substance Abuse Referral   (218) 535-1064 (HELP)

## 2023-10-25 NOTE — COMMUNITY RESOURCES LIST (ENGLISH)
10/25/2023   [facility]  N/A  For questions about this resource list or additional care needs, please contact your primary care clinic or care manager.  Phone: [phone]   Email: N/A   Address: [address] [place] [address]   Hours: N/A        Food and Nutrition       Food pantry  1  People Reaching Out to Other People (PROP) Distance: 2.5 miles      Pickup   [address] [place] [address]  Language: English, Danish  Hours: Mon - Tue 9:30 AM - 1:00 PM , Wed 3:00 PM - 6:30 PM , Thu - Fri 9:30 AM - 1:00 PM  Fees: Free   Phone: (785) 209-1336 Email: prop@[employer] Website: http://www.[place].KSE     2  [facility] - K-Tel Distance: 4.82 miles      [facility], Pickup   [address] [place] [address]  Language: English, Icelandic, Danish  Hours: Mon 12:00 AM - 7:00 PM , Tue 10:00 AM - 3:00 PM , Wed - Thu 10:00 AM - 2:30 PM  Fees: Free   Phone: (459) 820-6458 Email: ica@[employer].org Website: http://www.[employer].org     SNAP application assistance  3  People Reaching Out to Other People (PROP) Distance: 2.5 miles      In-Person, Phone/Virtual   [address] [place] [address]  Language: English, Danish  Hours: Mon - Tue 9:30 AM - 1:00 PM , Wed 3:00 PM - 6:30 PM , Thu - Fri 9:30 AM - 1:00 PM  Fees: Free   Phone: (160) 569-4818 Email: prop@[employer].org Website: http://www.[employer].org     4  [facility] Distance: 11.27 miles      In-Person, Phone/Virtual   [address] [place] [address]  Language: English, Danish  Hours: Mon - Fri 9:00 AM - 4:30 PM  Fees: Free   Phone: (177) 693-9913 Ext.113 Email: info@veap.org Website: https://[employer].org     Soup kitchen or free meals  5  [facility] Distance: 5.59 miles      [facility]   [address] [place] [address]  Language: English  Hours: Mon - Tue 5:30 PM - 6:30 PM , Sat -  Sun 5:30 PM - 6:30 PM  Fees: Free   Phone: (898) 620-8792 Email: office@[employer] Website: https://www.[employer]     6  [place] [facility] Distance: 6.2 miles      In-Person   [address] [place] [address]  Language: English  Hours: Mon - Fri 2:30 PM - 5:00 PM  Fees: Free   Phone: (713) 696-2694 Email: info@[employer] Website: http://[employer]/          Important Numbers & Websites       Emergency Services   [address]  [place] [address]  Poison Control   (141) 989-7026  Suicide Prevention Lifeline   (717) 726-5507 (TALK)  Child Abuse Hotline   (297) 484-6686 (4-A-Child)  Sexual Assault Hotline   (552) 718-9606 (HOPE)  National Runaway Safeline   (465) 427-9140 (RUNAWAY)  All-Options Talkline   (254) 687-8962  Substance Abuse Referral   (570) 212-6147 (HELP)

## 2023-10-30 ENCOUNTER — PATIENT OUTREACH (OUTPATIENT)
Dept: FAMILY MEDICINE | Facility: CLINIC | Age: 23
End: 2023-10-30
Payer: COMMERCIAL

## 2023-10-30 LAB
BKR LAB AP GYN ADEQUACY: NORMAL
BKR LAB AP GYN INTERPRETATION: NORMAL
BKR LAB AP HPV REFLEX: NO
BKR LAB AP PREVIOUS ABNORMAL: NORMAL
PATH REPORT.COMMENTS IMP SPEC: NORMAL
PATH REPORT.COMMENTS IMP SPEC: NORMAL
PATH REPORT.RELEVANT HX SPEC: NORMAL

## 2023-11-14 ASSESSMENT — ANXIETY QUESTIONNAIRES
7. FEELING AFRAID AS IF SOMETHING AWFUL MIGHT HAPPEN: SEVERAL DAYS
5. BEING SO RESTLESS THAT IT IS HARD TO SIT STILL: NOT AT ALL
3. WORRYING TOO MUCH ABOUT DIFFERENT THINGS: MORE THAN HALF THE DAYS
2. NOT BEING ABLE TO STOP OR CONTROL WORRYING: MORE THAN HALF THE DAYS
GAD7 TOTAL SCORE: 11
1. FEELING NERVOUS, ANXIOUS, OR ON EDGE: MORE THAN HALF THE DAYS
6. BECOMING EASILY ANNOYED OR IRRITABLE: NEARLY EVERY DAY

## 2023-11-14 ASSESSMENT — PATIENT HEALTH QUESTIONNAIRE - PHQ9
10. IF YOU CHECKED OFF ANY PROBLEMS, HOW DIFFICULT HAVE THESE PROBLEMS MADE IT FOR YOU TO DO YOUR WORK, TAKE CARE OF THINGS AT HOME, OR GET ALONG WITH OTHER PEOPLE: SOMEWHAT DIFFICULT
SUM OF ALL RESPONSES TO PHQ QUESTIONS 1-9: 15
5. POOR APPETITE OR OVEREATING: SEVERAL DAYS
SUM OF ALL RESPONSES TO PHQ QUESTIONS 1-9: 15

## 2023-11-15 ENCOUNTER — VIRTUAL VISIT (OUTPATIENT)
Dept: PSYCHOLOGY | Facility: CLINIC | Age: 23
End: 2023-11-15
Attending: PHYSICIAN ASSISTANT
Payer: COMMERCIAL

## 2023-11-15 DIAGNOSIS — R41.840 ATTENTION DEFICIT: ICD-10-CM

## 2023-11-15 DIAGNOSIS — F41.1 GENERALIZED ANXIETY DISORDER: ICD-10-CM

## 2023-11-15 DIAGNOSIS — F33.1 MAJOR DEPRESSIVE DISORDER, RECURRENT EPISODE, MODERATE (H): Primary | ICD-10-CM

## 2023-11-15 DIAGNOSIS — F88 DEVELOPMENTAL MENTAL DISORDER: ICD-10-CM

## 2023-11-15 PROCEDURE — 90832 PSYTX W PT 30 MINUTES: CPT | Mod: 95 | Performed by: PSYCHOLOGIST

## 2023-11-15 ASSESSMENT — COLUMBIA-SUICIDE SEVERITY RATING SCALE - C-SSRS
6. HAVE YOU EVER DONE ANYTHING, STARTED TO DO ANYTHING, OR PREPARED TO DO ANYTHING TO END YOUR LIFE?: NO
ATTEMPT LIFETIME: NO
2. HAVE YOU ACTUALLY HAD ANY THOUGHTS OF KILLING YOURSELF?: NO
1. HAVE YOU WISHED YOU WERE DEAD OR WISHED YOU COULD GO TO SLEEP AND NOT WAKE UP?: NO
TOTAL  NUMBER OF ABORTED OR SELF INTERRUPTED ATTEMPTS LIFETIME: NO
TOTAL  NUMBER OF INTERRUPTED ATTEMPTS LIFETIME: NO

## 2023-11-15 ASSESSMENT — ANXIETY QUESTIONNAIRES: GAD7 TOTAL SCORE: 11

## 2023-11-15 NOTE — PROGRESS NOTES
[facility]   Mental Health & Addiction Services     Progress Note - Initial Visit    Client Name:  Evelin Thurman Date: November 15, 2023         Service Type: Individual     Visit Start Time: 9:00am  Visit End Time: 9:30am    Visit #: 1    Attendees: Client attended alone    Service Modality:  Video Visit:      Provider verified identity through the following two step process.  Patient provided:  Patient  and Patient address    Telemedicine Visit: The patient's condition can be safely assessed and treated via synchronous audio and visual telemedicine encounter.      Reason for Telemedicine Visit: Services only offered telehealth    Originating Site (Patient Location): Patient's home    Distant Site (Provider Location): Provider Remote Setting- Home Office    Consent:  The patient/guardian has verbally consented to: the potential risks and benefits of telemedicine (video visit) versus in person care; bill my insurance or make self-payment for services provided; and responsibility for payment of non-covered services.     Patient would like the video invitation sent by:  Send to e-mail at: [email or web address]    Mode of Communication:  Video Conference via [facility]    Distant Location (Provider):  Off-site    As the provider I attest to compliance with applicable laws and regulations related to telemedicine.       DATA:  Extended Session (53+ minutes): No  Interactive Complexity: No   Crisis: No     Presenting Concerns/Current Stressors:   Patient presented to session to initiate the ADHD evaluation process.           3/31/2023     9:24 AM 2023     1:47 PM   PHQ   PHQ-9 Total Score 13 15   Q9: Thoughts of better off dead/self-harm past 2 weeks Not at all Not at all            3/31/2023     9:24 AM 2023     1:47 PM   SEN-7 SCORE   Total Score 13 (moderate anxiety)    Total Score 13 11       ASSESSMENT:  Mental Status Assessment:  Appearance:   Appropriate   Eye  Contact:   Good   Psychomotor Behavior: Normal   Attitude:   Cooperative   Orientation:   Person  Speech   Rate / Production: Normal/ Responsive   Volume:  Normal   Mood:    Anxious  Depressed   Affect:    Appropriate  Flat   Thought Content:  Clear   Thought Form:  Logical   Insight:    Good       Safety Issues and Plan for Safety and Risk Management:   Garrard Suicide Severity Rating Scale (Lifetime/Recent)      11/15/2023     9:05 AM   Garrard Suicide Severity Rating (Lifetime/Recent)   Q1 Wish to be Dead (Lifetime) N   Q2 Non-Specific Active Suicidal Thoughts (Lifetime) N   Actual Attempt (Lifetime) N   Has subject engaged in non-suicidal self-injurious behavior? (Lifetime) N   Interrupted Attempts (Lifetime) N   Aborted or Self-Interrupted Attempt (Lifetime) N   Preparatory Acts or Behavior (Lifetime) N   Calculated C-SSRS Risk Score (Lifetime/Recent) No Risk Indicated     Patient denies current fears or concerns for personal safety.  Patient denies current or recent suicidal ideation or behaviors.  Patient denies current or recent homicidal ideation or behaviors.  Patient denies current or recent self injurious behavior or ideation.  Patient denies other safety concerns.  Recommended that patient call 911 or go to the local ED should there be a change in any of these risk factors.   Patient reports there are no firearms in the house.    Diagnostic Criteria:  F33.1:  A. Five (or more) symptoms have been present during the same 2-week period and represent a change from previous functioning; at least one of the symptoms is either (1) depressed mood or (2) loss of interest or pleasure.   Depressed mood.   Diminished interest or pleasure in all, or almost all, activities.   Significant appetite change.  Significant sleep change.   Fatigue or loss of energy.   Feelings of worthlessness or inappropriate guilt.   Diminished ability to think or concentrate, or indecisiveness.   Psychomotor agitation or lethargy.    Recurrent thoughts of death.   B. The symptoms cause clinically significant distress or impairment in social, occupational, or other important areas of functioning.  C. The episode is not attributable to the physiological effects of a substance or to another medical condition.  D. The occurence of major depressive episode is not better explained by other thought / psychotic disorders.  E. There has never been a manic episode or hypomanic episode.     F41.1:  A. Excessive anxiety and worry, occurring more days than not for at least 6 months about a number of events or activities.   B. The individual finds it difficult to control the worry.  C. The anxiety and worry are associated with 3 or more of 6 symptoms.  D. The anxiety, worry, or physical symptoms cause clinically significant distress or impairment in social, occupational, or other important areas of functioning.  E. The disturbance is not attributable to the physiological effects of a substance (e.g., a drug of abuse, a medication) or another medical condition (e.g., hyperthyroidism).  F. The disturbance is not better explained by another mental disorder (e.g., anxiety or worry about having panic attacks in panic disorder, negative evaluation in social anxiety disorder [social phobia], contamination or other obsessions in obsessive-compulsive disorder, separation from attachment figures in separation anxiety disorder, reminders of traumatic events in posttraumatic stress disorder, gaining weight in anorexia nervosa, physical complaints in somatic symptom disorder, perceived appearance flaws in body dysmorphic disorder, having a serious illness in illness anxiety disorder, or the content of delusional beliefs in schizophrenia or delusional disorder).    F88:  A. A persistent pattern of inattention and/or hyperactivity-impulsivity that interferes with functioning or development, as characterized by (1) and/or (2):   1. Six or more inattention symptoms that  have persisted for at least 6 months to a degree that is inconsistent with developmental level and that negatively impacts directly on social and academic/occupational activities.   2. Six or more hyperactivity and impulsivity symptoms that have persisted for at least 6 months to a degree that is inconsistent with developmental level and that negatively impacts directly on social and academic/occupational activities.  B. Several symptoms (inattentive or hyperactive/impulsive) were present before the age of 12 years.  C. Several symptoms (inattentive or hyperactive/impulsive) present in ?2 settings (eg, at home, school, or work; with friends or relatives; in other activities).  D. There is clear evidence that the symptoms interfere with or reduce the quality of social, academic, or occupational functioning.  E. Symptoms do not occur exclusively during the course of schizophrenia or another psychotic disorder, and are not better explained by another mental disorder (eg, mood disorder, anxiety disorder, dissociative disorder, personality disorder, substance intoxication, or withdrawal).      DSM5 Diagnoses: (Sustained by DSM5 Criteria Listed Above)  Diagnoses:   1. Major depressive disorder, recurrent episode, moderate (H)    2. Generalized anxiety disorder    3. Developmental mental disorder    4. Attention deficit      Psychosocial & Contextual Factors: social support, partner is long-distance, employed seasonally     PROMIS-10 Scores  Global Mental Health Score: (P) 13  Global Physical Health Score: (P) 14   PROMIS TOTAL - SUBSCORES: (P) 27      Intervention:              Reviewed symptoms and history of presenting concern. Patient endorsed symptoms consistent with depression , anxiety , and ADHD. Patient denied symptoms associated with cookie, panic, OCD, trauma, and perceptual difficulties. Unable to complete diagnostic intake, will be completed in next session.   CBT: socratic questioning, positive  reinforcement  EFT: empathetic attunement, emotion checking, emotion naming  MI: open ended questions, affirmations, reflections        Attendance Agreement:  Client has not signed the attendance agreement. Discussed expectations at beginning of this first session and patient agreed.       PLAN:  Provider will continue Diagnostic Assessment in next session. Patient will complete Mukul questionnaires  and CNS Vital Signs prior to next session (11/29/2023).    Patient meets the following risk assessment and triage: Patient denied any current/recent/lifetime history of suicidal ideation and/or behaviors.  No safety plan indicated at this time.     Medical necessity criteria is warranted in order to: Measure a psychological disorder and its severity and functional impairment to determine psychiatric diagnosis when a mental illness is suspected, or to achieve a differential diagnosis from a range of medical/psychological disorders that present with similar constellations of symptoms (e.g., determination and measurement of anxiety severity and impact in the presence of ongoing asthma or heart disease), Perform symptom measurement to objectively measure treatment effectiveness and/or determine the need to refer for pharmacological treatment or other medical evaluation (e.g., based on severity and chronicity of symptoms), and Evaluate primary symptoms of impaired attention and concentration that can occur in many neurological and psychiatric conditions.    Medical necessity for psychological assessment is warranted as a result of the following: (1) A specific clinical question is posed that relates to the condition/symptoms being addressed (2) The question cannot be adequately addressed by clinical interview and/or behavioral observation (3) Results of psychological testing are reasonably expected to provide an answer to the query (4) It is reasonably expected that the testing will provide information leading to a clearer  diagnosis and/or guide treatment planning with an expectation of improved clinical outcome.    I acknowledge that, based upon current clinical information, the patient and I have reviewed and discussed issues pertaining to the purpose of therapy/testing, potential therapeutic goals, procedures, risks and benefits, and estimated duration of therapy/testing. Issues pertaining to fees/insurance and confidentiality were also addressed with the patient, who indicated understanding and elected to continue with appointments. I will not be providing any experimental procedures and, if we agree that a change in clinical procedure would be more beneficial, I will obtain specific consent for that procedure or refer you to another provider who has expertise in that area.       Katiuska David PsyD, LP  Clinical Psychologist

## 2023-11-28 ASSESSMENT — PATIENT HEALTH QUESTIONNAIRE - PHQ9
SUM OF ALL RESPONSES TO PHQ QUESTIONS 1-9: 13
10. IF YOU CHECKED OFF ANY PROBLEMS, HOW DIFFICULT HAVE THESE PROBLEMS MADE IT FOR YOU TO DO YOUR WORK, TAKE CARE OF THINGS AT HOME, OR GET ALONG WITH OTHER PEOPLE: SOMEWHAT DIFFICULT
SUM OF ALL RESPONSES TO PHQ QUESTIONS 1-9: 13

## 2023-11-29 ENCOUNTER — VIRTUAL VISIT (OUTPATIENT)
Dept: PSYCHOLOGY | Facility: CLINIC | Age: 23
End: 2023-11-29
Payer: COMMERCIAL

## 2023-11-29 DIAGNOSIS — F33.1 MAJOR DEPRESSIVE DISORDER, RECURRENT EPISODE, MODERATE (H): Primary | ICD-10-CM

## 2023-11-29 DIAGNOSIS — F41.1 GENERALIZED ANXIETY DISORDER: ICD-10-CM

## 2023-11-29 DIAGNOSIS — F88 DEVELOPMENTAL MENTAL DISORDER: ICD-10-CM

## 2023-11-29 PROCEDURE — 90791 PSYCH DIAGNOSTIC EVALUATION: CPT | Mod: 95 | Performed by: PSYCHOLOGIST

## 2023-11-29 NOTE — PROGRESS NOTES
[facility]  Provider Name:  Katiuska David     Credentials:  ABE Yu    PATIENT'S NAME: Evelin Thumran  PREFERRED NAME: Evelin  PRONOUNS: she/her  MRN: [id number]  : 2000  ADDRESS: [address] [place]  [place] [address]  ACCT. NUMBER:  [id number]  DATE OF SERVICE: 23  START TIME: 8:00am  END TIME: 8:30am  PREFERRED PHONE: [phone]  SERVICE MODALITY:  Video Visit:      Provider verified identity through the following two step process.  Patient provided:  Patient  and Patient address    Telemedicine Visit: The patient's condition can be safely assessed and treated via synchronous audio and visual telemedicine encounter.      Reason for Telemedicine Visit: Services only offered telehealth    Originating Site (Patient Location): Patient's home    Distant Site (Provider Location): Provider Remote Setting- Home Office    Consent:  The patient/guardian has verbally consented to: the potential risks and benefits of telemedicine (video visit) versus in person care; bill my insurance or make self-payment for services provided; and responsibility for payment of non-covered services.     Patient would like the video invitation sent by:  Send to e-mail at: [email or web address]    Mode of Communication:  Video Conference via [facility]    Distant Location (Provider):  Off-site    As the provider I attest to compliance with applicable laws and regulations related to telemedicine.    UNIVERSAL ADULT Mental Health DIAGNOSTIC ASSESSMENT    Identifying Information:  Patient is a 23 year old, ;  woman. The pronoun use throughout this assessment reflects the patient's chosen pronoun. Patient was referred for an assessment by Sandi Hilliard PA-C. Patient attended the session alone.     Chief Complaint:   Patient reported seeking services at this time for diagnostic assessment and recommendations for treatment. Patient's presenting concerns include: Forgetfulness,  "distractibility, zoning out in conversations, and lack of drive that has all worsened since graduating college. Specifically, the patient reported experiencing the following symptoms: difficulty sustaining attention, problems listening when spoken to directly, not following through with tasks, avoiding tasks that require sustained mental effort, losing things often, being easily distracted, and being forgetful.     Patient reported that she has not been assessed for ADHD in the past. Symptoms reportedly began in more recent years. The patient reported a history of anxiety symptoms that have \"always\" been present.  Indicated depression symptoms began in college.  Is taking her prescribed Prozac to help manage symptoms. Client reported that other professional(s) are involved in providing services, as she was referred by her PCP.    Social/Family History:  Patient reported she grew up in  [place], [place] . Patient was the second born of 2 children. There are no known complications during pregnancy or delivery. This is an intact family and parents remain . Patient reported no difficulty with childhood peer relationships. Reported that childhood was and \"pretty average suburban upbringing.\"  She reported having positive relationships with family members and played sports in her early years.  Denied ADHD symptoms in those environments.  Described her current relationships with family of origin as supportive with frequent communication.      The patient denied a history of learning disorders, special education programming, and receiving tutoring services. Patient reported sustaining 1 head injury at age 13 but denied LOC associated with that event as a child, she reported never struggling in school, was not disruptive in class, and was not distracted.  The patient reported that homework was procrastinated but finished.  She further indicated that her parents and teachers pushed her \"in a good way\" and she was " "participating in the gifted and talented program. Recalled academic strengths in math and science.  The patient's highest education level is college graduate.  After high school, the patient completed college and stated that she \"did pretty well.\"  Did experience significant test anxiety, particularly with physics, so she received academic accommodations to have time and a half for test taking in that subject.  The COVID-19 pandemic began in the patient's second year of college and she reported that she continued to perform well.  Stated that she was able to rewind lectures and take thorough notes on her own time.    The patient describes her cultural background as White, , American.  Cultural influences and impact on patient's life structure, values, norms, and healthcare:  Cartesian .  The patient described that she does keep in touch with some of her  heritage but it is not a core component of her identity. Patient identified her preferred language to be English. Patient reported she does not need the assistance of an  or other support involved in therapy.     Patient is currently in a committed relationship with her partner of about 4 years. Patient identified their sexual orientation as bi-sexual. Patient reported having zero child(lito). Patient identified partner, parents, and friends as part of her support system. Patient identified the quality of these relationships as fair.      Patient reported that she is currently staying with her parents for about 2 months.  Otherwise, is typically living in traveling with her partner.    Patient works seasonally.  During summer months, the patient works as a  in [place] and reported that the position is laid back.  Stated that she is forgetful and her boss has made comments about being slow.  Has a seasonal position as a  and in [place] lined up with this winter.. Patient reports finances are obtained " through employment.     Patient has not served in the .     Patient reported that she has not been involved with the legal system. Patient denies being on probation / parole / under the jurisdiction of the court.    Patient has received a 's license. Patient reported that her partner has made complaints about her driving.    Patient's Strengths and Limitations:  Patient identified the following strengths or resources that will help them succeed in treatment: friends / good social support, family support, insight, intelligence, positive work environment, motivation, strong social skills, and work ethic. Things that may interfere with the patient's success in treatment include: none identified.     Personal and Family Medical History:  Patient reported the following biological family members or relatives with mental health issues: Mother experienced an Anxiety Disorder.  Patient previously received the following mental health diagnosis: an Anxiety Disorder and Depression.   Patient has received the following mental health services in the past: counseling and medication(s) from physician / PCP.   Patient is not currently receiving any mental health services.  Hospitalizations: None.   Previous/Current commitments: None.     Patient has had a physical exam to rule out medical causes for current symptoms. Date of last physical exam was 10/25/2023. The patient reported that she does not have a regular PCP. Patient reported no current medical concerns. Patient denies any issues with pain.. There are not significant appetite/nutritional concerns/weight changes.    Current Outpatient Medications   Medication    FLUoxetine (PROZAC) 40 MG capsule    hydrOXYzine (ATARAX) 10 MG tablet    levonorgestrel (MIRENA) 20 MCG/DAY IUD    methocarbamol (ROBAXIN) 500 MG tablet    ondansetron (ZOFRAN) 4 MG tablet     No current facility-administered medications for this visit.       Client reports taking prescribed medications  as prescribed.    Patient Allergies:   Allergies   Allergen Reactions    Dairy Digestive GI Disturbance and Other (See Comments)     Inflammation reaction to sinus       Medical History:   Past Medical History:   Diagnosis Date    SEN (generalized anxiety disorder) 3/31/2023       Family history includes: family history includes Brain Cancer in her cousin; Diabetes in her paternal grandfather; No Known Problems in her father and mother.    Current Mental Status Exam:   Appearance:  Appropriate    Eye Contact:  Good   Psychomotor:  Normal       Gait / station:  no problem  Attitude / Demeanor: Cooperative   Speech      Rate / Production: Normal/ Responsive      Volume:  Normal  volume      Language:  intact  Mood:   Depressed   Affect:   Appropriate    Thought Content: Clear   Thought Process: Logical       Associations: No loosening of associations  Insight:   Good   Judgment:  Intact   Orientation:  All  Attention/concentration: Good    Rating Scales:  PHQ9:        3/31/2023     9:24 AM 11/14/2023     1:47 PM 11/28/2023     4:07 PM   PHQ-9 SCORE   PHQ-9 Total Score MyChart 13 (Moderate depression) 15 (Moderately severe depression) 13 (Moderate depression)   PHQ-9 Total Score 13 15 13       GAD7:        3/31/2023     9:24 AM 11/14/2023     1:47 PM   SEN-7 SCORE   Total Score 13 (moderate anxiety)    Total Score 13 11       Substance Use:  Patient did report a family history of substance use concerns; see medical history section for details. Patient has not received chemical dependency treatment in the past. Patient has not ever been to detox. Patient is not currently receiving any chemical dependency treatment. Patient reported  no  problems as a result of her substance use.    Alcohol: Patient reported rare use, less than 1 time per month.  Nicotine: None.  Cannabis: Experimentation in the past.  Caffeine: 3-4 caffeinated beverages per week, usually tea.  Street Drugs: None.  Prescription Drugs: None.    CAGE: None  of the patient's responses to the CAGE screening were positive / Negative CAGE score     Substance Use: No symptoms    Based on the negative CAGE score and clinical interview there are not indications of drug or alcohol abuse.    Significant Losses/Trauma/Abuse/Neglect Issues:   There are indications or report of significant loss, trauma, abuse or neglect issues related to: are no indications and client denies any losses, trauma, abuse, or neglect concerns.  Concerns for possible neglect are not present.    Safety Assessment:   [place] Suicide Severity Rating Scale (Lifetime/Recent)[place] Suicide Severity Rating Scale (Lifetime/Recent)      11/15/2023     9:05 AM   [place] Suicide Severity Rating (Lifetime/Recent)   Q1 Wish to be Dead (Lifetime) N   Q2 Non-Specific Active Suicidal Thoughts (Lifetime) N   Actual Attempt (Lifetime) N   Has subject engaged in non-suicidal self-injurious behavior? (Lifetime) N   Interrupted Attempts (Lifetime) N   Aborted or Self-Interrupted Attempt (Lifetime) N   Preparatory Acts or Behavior (Lifetime) N   Calculated C-SSRS Risk Score (Lifetime/Recent) No Risk Indicated     Patient denies current homicidal ideation and behaviors.  Patient denies current self-injurious ideation and behaviors.    Patient denied risk behaviors associated with substance use.  Patient denies any high risk behaviors associated with mental health symptoms.  Patient reports the following current concerns for their personal safety: None.  Patient reports there are not firearms in the house.     History of Safety Concerns:  Patient denied a history of homicidal ideation.     Patient denied a history of personal safety concerns.    Patient denied a history of assaultive behaviors.    Patient denied a history of sexual assault behaviors.     Patient denied a history of risk behaviors associated with substance use.  Patient denies any history of high risk behaviors associated with mental health  symptoms.  Patient reports the following protective factors: forward or future oriented thinking; safe and stable environment; regular sleep; secure attachment; help seeking behaviors when distressed; effective problem solving skills; healthy fear of risky behaviors or pain    Risk Plan:  See Recommendations for Safety and Risk Management Plan below.    Review of Patient-Reported Symptoms:  Depression: Change in sleep, Lack of interest, Change in energy level, Difficulties concentrating, Change in appetite, Psychomotor slowing or agitation, Low self-worth, and Feeling sad, down, or depressed  [place]:  No Symptoms  Psychosis: No Symptoms  Anxiety: Excessive worry, Nervousness, Ruminations, Poor concentration, and Irritability  Panic:  No symptoms  Post Traumatic Stress Disorder:  No Symptoms   Eating Disorder: No Symptoms  ADD / ADHD:  Inattentive, Difficulties listening, Poor task completion, Distractibility, and Forgetful  Conduct Disorder: No symptoms  Autism Spectrum Disorder: No observed symptoms. An autism spectrum disorder diagnosis requires specialized assessment.  Obsessive Compulsive Disorder: No Symptoms  Patient reports the following compulsive behaviors and treatment history:  No symptoms .      Diagnostic Criteria:   F33.1:  A. Five (or more) symptoms have been present during the same 2-week period and represent a change from previous functioning; at least one of the symptoms is either (1) depressed mood or (2) loss of interest or pleasure.   Depressed mood.   Diminished interest or pleasure in all, or almost all, activities.   Significant appetite change.  Significant sleep change.   Fatigue or loss of energy.   Feelings of worthlessness or inappropriate guilt.   Diminished ability to think or concentrate, or indecisiveness.   Psychomotor agitation or lethargy.   B. The symptoms cause clinically significant distress or impairment in social, occupational, or other important areas of functioning.  C. The  episode is not attributable to the physiological effects of a substance or to another medical condition.  D. The occurence of major depressive episode is not better explained by other thought / psychotic disorders.  E. There has never been a manic episode or hypomanic episode.     F41.1:  A. Excessive anxiety and worry, occurring more days than not for at least 6 months about a number of events or activities.   B. The individual finds it difficult to control the worry.  C. The anxiety and worry are associated with 3 or more of 6 symptoms.  D. The anxiety, worry, or physical symptoms cause clinically significant distress or impairment in social, occupational, or other important areas of functioning.  E. The disturbance is not attributable to the physiological effects of a substance (e.g., a drug of abuse, a medication) or another medical condition (e.g., hyperthyroidism).  F. The disturbance is not better explained by another mental disorder (e.g., anxiety or worry about having panic attacks in panic disorder, negative evaluation in social anxiety disorder [social phobia], contamination or other obsessions in obsessive-compulsive disorder, separation from attachment figures in separation anxiety disorder, reminders of traumatic events in posttraumatic stress disorder, gaining weight in anorexia nervosa, physical complaints in somatic symptom disorder, perceived appearance flaws in body dysmorphic disorder, having a serious illness in illness anxiety disorder, or the content of delusional beliefs in schizophrenia or delusional disorder).    F88:  A. A persistent pattern of inattention and/or hyperactivity-impulsivity that interferes with functioning or development, as characterized by (1) and/or (2):   1. Six or more inattention symptoms that have persisted for at least 6 months to a degree that is inconsistent with developmental level and that negatively impacts directly on social and academic/occupational  activities.   2. Six or more hyperactivity and impulsivity symptoms that have persisted for at least 6 months to a degree that is inconsistent with developmental level and that negatively impacts directly on social and academic/occupational activities.  B. Several symptoms (inattentive or hyperactive/impulsive) were present before the age of 12 years.  C. Several symptoms (inattentive or hyperactive/impulsive) present in ?2 settings (eg, at home, school, or work; with friends or relatives; in other activities).  D. There is clear evidence that the symptoms interfere with or reduce the quality of social, academic, or occupational functioning.  E. Symptoms do not occur exclusively during the course of schizophrenia or another psychotic disorder, and are not better explained by another mental disorder (eg, mood disorder, anxiety disorder, dissociative disorder, personality disorder, substance intoxication, or withdrawal).    Functional Status:  Patient reports the following functional impairments: management of the household and or completion of tasks, organization, and relationship(s).       PROMIS-10 Scores        11/14/2023     2:02 PM   PROMIS-10 Total Score w/o Sub Scores   PROMIS TOTAL - SUBSCORES 27       Nonprogrammatic care: Patient is requesting basic services to address current mental health concerns.    Clinical Summary:  1. Reason for assessment: assessing reported deficits in executive functioning (rule in/out ADHD).  2. Psychosocial, cultural and contextual factors: Social support, employed seasonally.  3. Principal DSM-5 diagnoses (Sustained by DSM5 Criteria Listed Above) and other diagnoses relevant to this service:   1. Major depressive disorder, recurrent episode, moderate (H)    2. Generalized anxiety disorder    3. Developmental mental disorder      4. Prognosis: Expect Improvement.  5. Likely consequences of symptoms if not treated: Continued difficulties with inattention.  6. Client strengths  include: educated, employed, has a previous history of therapy, insightful, intelligent, motivated, support of family, friends and providers, and supportive .     Recommendations:   Feedback session scheduled for 12/18/2023.    1. Plan for Safety and Risk Management:  Safety and Risk: Recommended that patient call 911 or go to the local ED should there be a change in any of these risk factors..         Report to child / adult protection services was NA.     2. Patient's identified mental health concerns with a cultural influence will be addressed in final recommendations.     3. Initial Treatment will focus on: ADHD testing. See above.      4. Resources/Service Plan:    services are not indicated.   Modifications to assist communication are not indicated.   Additional disability accommodations are not indicated.      5. Collaboration:   Collaboration / coordination of treatment will be initiated with the following  support professionals: primary care physician.      6.  Referrals:   The following referral(s) will be initiated: N/A.    A Release of Information has been obtained for the following: N/A.   Emergency Contact was not obtained.    Clinical Substantiation/medical necessity for the above recommendations:     Medical necessity criteria is warranted in order to: Measure a psychological disorder and its severity and functional impairment to determine psychiatric diagnosis when a mental illness is suspected, or to achieve a differential diagnosis from a range of medical/psychological disorders that present with similar constellations of symptoms (e.g., determination and measurement of anxiety severity and impact in the presence of ongoing asthma or heart disease), Perform symptom measurement to objectively measure treatment effectiveness and/or determine the need to refer for pharmacological treatment or other medical evaluation (e.g., based on severity and chronicity of symptoms), and Evaluate  primary symptoms of impaired attention and concentration that can occur in many neurological and psychiatric conditions.    Medical necessity for psychological assessment is warranted as a result of the following: (1) A specific clinical question is posed that relates to the condition/symptoms being addressed (2) The question cannot be adequately addressed by clinical interview and/or behavioral observation (3) Results of psychological testing are reasonably expected to provide an answer to the query (4) It is reasonably expected that the testing will provide information leading to a clearer diagnosis and/or guide treatment planning with an expectation of improved clinical outcome.    7. NATHALIE:    NATHALIE: N/A.    8. Records:   These were reviewed at time of assessment.   Information in this assessment was obtained from the medical record and  provided by patient who is a good historian. Patient will have open access to their mental health medical record.    9. Interactive Complexity: No     Parts of this documentation may have been completed using dictation software. Potential errors may result and are unintentional.       Katiuska David PsyD, LP  November 29, 2023

## 2024-01-02 ASSESSMENT — ANXIETY QUESTIONNAIRES
GAD7 TOTAL SCORE: 11
6. BECOMING EASILY ANNOYED OR IRRITABLE: NEARLY EVERY DAY
GAD7 TOTAL SCORE: 11
GAD7 TOTAL SCORE: 11
4. TROUBLE RELAXING: SEVERAL DAYS
8. IF YOU CHECKED OFF ANY PROBLEMS, HOW DIFFICULT HAVE THESE MADE IT FOR YOU TO DO YOUR WORK, TAKE CARE OF THINGS AT HOME, OR GET ALONG WITH OTHER PEOPLE?: SOMEWHAT DIFFICULT
5. BEING SO RESTLESS THAT IT IS HARD TO SIT STILL: NOT AT ALL
1. FEELING NERVOUS, ANXIOUS, OR ON EDGE: MORE THAN HALF THE DAYS
7. FEELING AFRAID AS IF SOMETHING AWFUL MIGHT HAPPEN: MORE THAN HALF THE DAYS
2. NOT BEING ABLE TO STOP OR CONTROL WORRYING: MORE THAN HALF THE DAYS
IF YOU CHECKED OFF ANY PROBLEMS ON THIS QUESTIONNAIRE, HOW DIFFICULT HAVE THESE PROBLEMS MADE IT FOR YOU TO DO YOUR WORK, TAKE CARE OF THINGS AT HOME, OR GET ALONG WITH OTHER PEOPLE: SOMEWHAT DIFFICULT
3. WORRYING TOO MUCH ABOUT DIFFERENT THINGS: SEVERAL DAYS
7. FEELING AFRAID AS IF SOMETHING AWFUL MIGHT HAPPEN: MORE THAN HALF THE DAYS

## 2024-01-03 ENCOUNTER — VIRTUAL VISIT (OUTPATIENT)
Dept: PSYCHOLOGY | Facility: CLINIC | Age: 24
End: 2024-01-03
Payer: COMMERCIAL

## 2024-01-03 DIAGNOSIS — F41.1 GENERALIZED ANXIETY DISORDER: ICD-10-CM

## 2024-01-03 DIAGNOSIS — F33.1 MAJOR DEPRESSIVE DISORDER, RECURRENT EPISODE, MODERATE (H): Primary | ICD-10-CM

## 2024-01-03 PROCEDURE — 96130 PSYCL TST EVAL PHYS/QHP 1ST: CPT | Mod: 95 | Performed by: PSYCHOLOGIST

## 2024-01-03 PROCEDURE — 96131 PSYCL TST EVAL PHYS/QHP EA: CPT | Mod: 95 | Performed by: PSYCHOLOGIST

## 2024-01-03 NOTE — PROGRESS NOTES
[facility]   Mental Health & Addiction Services     Progress Note - ADHD Feedback Session     Patient Name: Evelin Thurman  Date: January 3, 2024       Service Type:  Individual       Session Start Time: 4:00pm  Session End Time:  4:24pm     Session Length: 24 minutes    Session #: 3    Attendees: Patient attended alone    Service Modality: Video Visit:      Provider verified identity through the following two step process.  Patient provided:  Patient  and Patient address    Telemedicine Visit: The patient's condition can be safely assessed and treated via synchronous audio and visual telemedicine encounter.      Reason for Telemedicine Visit: Services only offered telehealth    Originating Site (Patient Location): [place] - Patient will be there for months so feedback was delivered anyway in an effort to provide results in a timely manner    Distant Site (Provider Location): Provider Remote Setting- Home Office    Consent:  The patient/guardian has verbally consented to: the potential risks and benefits of telemedicine (video visit) versus in person care; bill my insurance or make self-payment for services provided; and responsibility for payment of non-covered services.     Patient would like the video invitation sent by:  Send to e-mail at: [email or web address]    Mode of Communication:  Video Conference via [employer]    Distant Location (Provider):  Off-site    As the provider I attest to compliance with applicable laws and regulations related to telemedicine.          3/31/2023     9:24 AM 2023     1:47 PM 2023     4:07 PM   PHQ   PHQ-9 Total Score 13 15 13   Q9: Thoughts of better off dead/self-harm past 2 weeks Not at all Not at all Not at all           3/31/2023     9:24 AM 2023     1:47 PM 2024     8:13 PM   SEN-7 SCORE   Total Score 13 (moderate anxiety)  11 (moderate anxiety)   Total Score 13 11 11         DATA      Progress  Since Last Session (Related to Symptoms / Goals / Homework):   Symptoms: Stable.    Homework: Completed.      Treatment Objective(s) Addressed in This Session:   Provided feedback on ADHD evaluation. Reviewed test results in depth. Plan of care and recommendations were discussed based on testing data. See full report attached on secondary note in this encounter.     Intervention:   Provided feedback to patient regarding testing results, diagnoses, and treatment recommendations. Test results are not consistent with an ADHD diagnosis. Symptoms are better explained by depression and anxiety disorders. Personalized suggestions regarding symptoms were offered. Patient had the opportunity to ask questions; she expressed understanding.        ASSESSMENT: Current Emotional / Mental Status (status of significant symptoms):   Risk status (Self / Other harm or suicidal ideation)   Patient denies current fears or concerns for personal safety.   Patient denies current or recent suicidal ideation or behaviors.   Patient denies current or recent homicidal ideation or behaviors.   Patient denies current or recent self injurious behavior or ideation.   Patient denies other safety concerns.   Patient reports there has been no change in risk factors since their last session.     Patient reports there has been no change in protective factors since their last session.     Recommended that patient call 911 or go to the local ED should there be a change in any of these risk factors.     Appearance:   Appropriate    Eye Contact:   Good    Psychomotor Behavior: Normal    Attitude:   Cooperative    Orientation:   All   Speech    Rate / Production: Normal     Volume:  Normal    Mood:    Depressed    Affect:    Appropriate    Thought Content:  Clear    Thought Form:  Coherent  Logical    Insight:    Good      Medication Review:   No changes to current psychiatric medication(s)     Medication Compliance:   Yes     Changes in Health  Issues:   None reported     Chemical Use Review:   Substance Use: Chemical use reviewed, no active concerns identified      Nicotine Use: No current tobacco use.      Diagnosis:  1. Major depressive disorder, recurrent episode, moderate (H)    2. Generalized anxiety disorder          PLAN:   Recommendations are outlined in full evaluation report (attached to this encounter).   Patient indicated understanding and will contact the clinic if there are further questions.    Parts of this documentation may have been completed using dictation software. Potential errors may result and are unintentional.       Katiuska David PsyD, LP  Clinical Psychologist         Psychological Testing Services Summary       Testing Evaluation Services Base: 77647  (first 60 mins) Add-on: 55307  (each addtl 60 mins)   Record Review and Clarify Referral Question   8:40am-8:50am on 11/15/2023 10 minutes   Clinical Decision Making/Battery Modification   7:45am-8:00am on 11/29/2023 15 minutes   Integration/Report Generation   12:00pm-1:00pm on 12/14/2023 ([facility])  4:00pm-4:30pm on 12/14/2023 ([facility])  6:00pm-7:00pm on 12/14/2023 (Final Report)   60 minutes  30 minutes  60 minutes   Interactive Feedback Session   4:00pm-4:24pm on 1/3/2024 24 minutes   Post-Service Work   4:24pm-4:39pm on 1/3/2024 15 minutes   Total Time: 214 minutes   Total Units: 1 3       Diagnoses:   F33.1 Major depressive disorder, recurrent episode, moderate  F41.1 Generalized anxiety disorder

## 2024-01-03 NOTE — PROGRESS NOTES
"    Psychological Assessment Report    Patient: Evelin Thurman  YOB: 2000  MRN: [id number]   Date(s) of assessment: 11/15/2023 and 11/29/2023  Referral Source: [facility]   Reason for Referral: assessing reported deficits in executive functioning     IDENTIFYING INFORMATION AND BRIEF HISTORY OF PRESENTING PROBLEM: Evelin Thurman is a 23-year-old White woman who presented to the initial diagnostic intake appointments on 11/15/2023 and 11/29/2023 (see diagnostic intake dated 11/29/2023 for more detailed background information) due to primary concerns with forgetfulness, zoning out in conversations, and feeling as though she does not have the drive to do things she likes. The patient stated that she has been noticing symptoms for a while, but they have been worse since graduating college.     As a child, the patient reported that she \"never struggled\" in school when she was young.  Went on to explain that school came easy for her and she recalled both parents and teachers pushing her \"in a good way.\"  Further denied problems with being disruptive in class.  Stated that homework would occasionally be procrastinated, but always finished.  Was placed in the gifted and talented program as well.  After high school, the patient completed a bachelor's degree.  She reported that her courses were STEM-based and she continued to perform well.  The COVID-19 pandemic began during her second year of school and she stated that the online format actually catered better to her style.  She was able to rewind lectures and take more thorough notes.  However, while in college, she stated that she began experiencing test anxiety and was able to receive academic accommodations in order to have 1.5x time to complete tests.  She currently works seasonally, as a  during some portions of the year and as a  during winter months.  She reported problems with " "forgetfulness and being less efficient than she could be.  Currently, the patient reported experiencing the following symptoms: Difficulty sustaining attention, does not listen when spoken to directly, avoids/dislikes tasks that require sustained mental effort, loses things often, feels easily distracted, is forgetful, and often interrupts others.  The patient indicated that all of these problems became noticeable in college and worse afterwards.  She is seeking diagnostic clarification and updated treatment recommendations.    Mental Health History: The patient reported a history of anxiety symptoms that have \"always\" been present. She indicated worrying about her relationship (which is currently long-distance), the future, abandonment, and \"generally anything.\" She went on to describe that depression symptoms began in college. She is currently taking her prescribed Prozac to help manage symptoms. Has been on this medication since age 17 and the dose was doubled about 1 year ago. The patient denied a history of manic symptoms, social anxiety, current phobic responses, symptoms of obsessive-compulsive disorder, trauma, and perceptual difficulties. The patient denied issues with sexual compulsivity, gambling, and disordered eating.    Developmental History: The patient reported that she believes that she is the product of a full-term pregnancy and there were no complications during her mother's pregnancy or birth. The patient reported that she believes she met all of her developmental milestones on time. She denied a history of learning disorders and special educational programming. Reported one head injury at age 13, but denied LOC associated with that event. The patient recalled academic strengths in math and science.  She grew up with her mother, father, and older sister in the home.  Parents remain .  Described her household as a \"normal dynamic.\"  Also participated in several sports and denied ADHD " symptoms in those environments.    Chemical Dependence/Substance Abuse History: The patient denied a history of chemical or substance abuse.     SOURCES OF DATA/ASSESSMENT: Review of medical and psychiatric records, consideration of behavioral observations during the testing (if applicable), and the results of the psychological tests are all considered in the preparation of this psychological test report. It is important to note that test results comprise a hypothesis of the patient's mental health concerns and are not an independent or conclusive assessment. Test results are combined with the patient's available medical, psychological, behavioral data for an integrated interpretation and report. Due to virtual/remote administration, certain aspects of the assessment process were impacted, such as access to direct patient observation, and maintaining an environment conducive to testing. As such, external factors have the potential to affect the validity of data collected.    TESTS ADMINISTERED:  CNS Vital Signs Neurocognitive Battery  Mukul Adult ADHD Rating Scale-IV: Self and Other Reports (BAARS-IV)  Mukul Functional Impairment Scale: Self and Other Reports (BFIS)  M[place]ul Deficits in Executive Functioning Scale (BDEFS)  Generalized Anxiety Disorder Questionnaire (SEN-7)  Patient Health Questionnaire- 9 (PHQ-9)    BEHAVIORAL OBSERVATIONS: The patient was pleasant and cooperative throughout all interview and explanation of testing process. The patient was oriented to person, place, and time. Mood was neutral. Eye contact was adequate and speech was at normal rate and rhythm. Motor activity was appropriate. Due to virtual/remote administration, direct patient observation was not possible during the testing process, and it is unknown if the patient was able to maintain an environment conducive to testing. As such, external factors have the potential to affect the validity of data collected.     TEST RESULTS: Test  results comprise a hypothesis of the patient's mental health concerns and are not an independent or conclusive assessment. Test results are combined with the patient's available medical, psychological, behavioral, and observational data for an integrated interpretation and report.    CNS Vital Signs Neurocognitive Battery  The CNS Vital Signs Neurocognitive Battery is a remotely-administered assessment comprised of seven core subtests to individually measure the patient's verbal memory, visual memory, motor speed, psychomotor speed, reaction time, focus, ability to sustain attention and ability to adapt to changing rules and tasks.      Above average domain scores indicate a standard score (SS) greater than 109 or a Percentile Rank (WA) greater than 74, indicating a high functioning test subject. Average is a SS  or WA 25-74, indicating normal function. Low Average is a SS 80-89 or WA 9-24 indicating a slight deficit or impairment. Below Average is a SS 70-79 or WA 2-8, indicating a moderate level of deficit or impairment. Very Low is a SS less than 70 or a WA less than 2, indicating a deficit and impairment.  Validity Indicator denotes a guideline for representing the possibility of an invalid test or domain score, and can be influenced by patient understanding, effort, or other conditions.    The patient's results are detailed below:    Domain Standard Score Percentile Description Validity   Neurocognitive Index 105 63 Average Yes   Composite Memory Measure 94 34 Average Yes   Verbal Memory 100 50 Average Yes   Visual Memory 91 27 Average Yes   Psychomotor Speed 112 79 Above Average Yes   Reaction Time 112 79 Above Average Yes   Complex Attention 104 61 Average Yes   Cognitive Flexibility 102 55 Average Yes   Processing Speed 97 42 Average Yes   Executive Function 102 55 Average Yes   Reasoning 95 37 Average Yes   Working Memory 119 90 Above Average Yes   Sustained Attention  115 84 Above Average Yes    Simple Attention 108 70 Average Yes   Motor Speed 117 87 Above Average Yes     Neurocognitive Index (NCI): Measures an average score derived from the domain scores or a general assessment of the overall neurocognitive status of the patient. The patient's NCI score is 105, with a percentile of 63, and falls within the average range.    Composite Memory: Measures how well subject can recognize, remember, and retrieve words and geometric figures, and is comprised of the Visual and Verbal Memory domains. The patient's Composite Memory score is 94, with a percentile of average, and falls within the average range.    Verbal Memory: Measures how well subject can recognize, remember, and retrieve words. The patient's Verbal Memory score is 100, with a percentile of 50, and falls within the average range.    Visual Memory: Measures how well subject can recognize, remember and retrieve geometric figures. The patient's Visual Memory score is 91, with a percentile of 27, and falls within the above average range.    Psychomotor Speed: Measures how well a subject perceives, attends, responds to complex visual-perceptual information and performs simple fine motor coordination, and is comprised of the Motor Speed and Processing Speed indexes. The patient's Psychomotor Speed score is 112, with a percentile of 79, and falls within the above average range.    Reaction Time: Measures how quickly the subject can react, in milliseconds, to a simple and increasingly complex direction set. The patient's Reaction Time score is 112, with a percentile of 79, and falls within the average range.    Complex Attention: Measures the ability to track and respond to a variety of stimuli over lengthy periods of time and/or perform complex mental tasks requiring vigilance quickly and accurately. The patient's Complex Attention score is 104, with a percentile of 61, and falls within the average range.    Cognitive Flexibility: Measures how well  subject is able to adapt to rapidly changing and increasingly complex set of directions and/or to manipulate the information. The patient's Cognitive Flexibility score is 102, with a percentile of 55, and falls within the average range.    Processing Speed: Measures how well a subject recognizes and processes information i.e., perceiving, attending/responding to incoming information, motor speed, fine motor coordination, and visual-perceptual ability. The patient's Processing Speed score is 97, with a percentile of 42, and falls within the average range.    Executive Function: Measures how well a subject recognizes rules, categories, and manages or navigates rapid decision making. The patient's Executive Function score is 102, with a percentile of 55, and falls within the average range.    Reasoning: Measures how well a subject can perceive and understand the meaning of visual or abstract information and recognizing relationships between visual-abstract concepts. The patient's Reasoning score is 95, with a percentile of 37, and falls in the average range.     Working Memory: Measures how well a subject can perceive and attend to symbols using short-term memory processes. Also measures the ability to carry out short-term memory tasks that support decision making, problem solving, planning, and execution. The patient's Working Memory score is 119, with a percentile of 90, and falls in the above average range.    Sustained Attention: Measures how well a subject can direct and focus cognitive activity on specific stimuli. Also measurs how well a subject can focus and complete task or activity, sequence action, and focus during complex thought. The patient's Sustained Attention score is 115, with a percentile of 84, and falls in the above average range.    Simple Attention: Measures the ability to track and respond to a single defined stimulus over lengthy periods of time while performing vigilance and response  "inhibition quickly and accurately to a simple task. The patient's Simple Attention score is 108, with a percentile of 70, and falls within the average range.    Motor Speed: Measure: Ability to perform simple movements to produce and satisfy an intention towards a manual action and goal. The patient's Motor Speed score is 117, with a percentile of 87, and falls within the average range.    Mukul Adult ADHD Rating Scale-IV: Self and Other Reports (BAARS-IV)  The BAARS-IV assesses for symptoms of ADHD that are experienced in one's daily life. This assessment measure includes self and collateral rating scales designed to provide information regarding current and childhood symptoms of ADHD including inattention, hyperactivity, and impulsivity. Self-report scores are reported as percentiles. Scores at the 76th-83rd percentile are considered marginal, scores at the 84th-92nd percentile are considered borderline, scores at the 93rd-95th percentile are considered mild, scores at the 96th-98th percentile are considered moderate, and those at the 99th percentile are considered severe. Collateral or \"other\" rating scales are reported as number of symptoms observed in comparison to those reported by the client. Norms and percentile scores are not available for collateral reports.     Current Symptoms Scale--Self Report:   Client completed the self-report inventory of current symptoms. The results indicate that the client's Total ADHD Score was 42 which places the patient in the 99+ percentile for overall ADHD symptoms. In addition, the client endorsed 9/9 (99+ percentile) Inattention symptoms, 0/9 (1-75th percentile) Hyperactivity-Impulsivity symptoms, and 9/9 (99+ percentile) Sluggish Cognitive Tempo symptoms. Client indicated that the reported symptoms have resulted in impaired functioning in school, home, work, and social relationships. Overall, the results suggest the client is experiencing moderate ADHD symptoms. "     Current Symptoms Scale--Other Report:  Client's partner completed the collateral report inventory of current symptoms. Based on the collateral contact's observation of symptoms, the client demonstrates 9/9 Inattention symptoms, 3/5 Hyperactivity symptoms, 0/4 Impulsivity symptoms, and 9/9 Sluggish Cognitive Tempo symptoms. The client's Total ADHD Score was 51. The collateral contact indicated the client demonstrates impaired functioning at home, work, and social relationships.  The collateral- and self-report scores are not significantly different.    Childhood Symptoms Scale--Self-Report:  Client completed the self-report inventory of childhood symptoms. The results indicate that the client's Total ADHD Score was 25 which places the patient in the 51-75th percentile for overall ADHD symptoms in childhood. In addition, the client endorsed 1/9 (79th percentile) Inattention symptoms and 0/9 (1-75th percentile) Hyperactivity-Impulsivity symptoms. Client indicated that the reported symptoms resulted in impaired functioning in no areas. Overall, the results suggest the client experienced no significant symptoms of ADHD as a child.     Childhood Symptoms Scale--Other Report:  Client's mother completed the collateral report inventory of childhood symptoms. Based on the collateral contact's recollection of client's childhood symptoms, the client demonstrated 0/9 Inattention symptoms and 0/9 Hyperactivity-Impulsivity symptoms. The client's Total ADHD Score was 27. The collateral contact indicated the client demonstrates impaired functioning in no areas. The collateral- and self-report scores are not significantly different.                           Mukul Functional Impairment Scale: Self and Other Reports (BFIS)  The BFIS is used to assess an individuals' psychosocial impairment in major life/daily activities that may be due to a mental health disorder. This assessment measure includes self and collateral rating  "scales. Self-report scores are reported as percentiles. Scores at the 76th-83rd percentile are considered marginal, scores at the 84th-92nd percentile are considered borderline, scores at the 93rd-95th percentile are considered mild, scores at the 96th-98th percentile are considered moderate, and those at the 99th percentile are considered severe. Collateral or \"other\" rating scales are reported as number of symptoms observed in comparison to those reported by the client. Norms and percentile scores are not available for collateral reports.     Results indicate the client identified impairment (scores at or greater than 93rd percentile) in the following areas: home-family, home-chores, work, social-friends, education, marriage/cohabiting/dating, and daily responsibilities. The client's Mean Impairment Score was 4.4 (76-84th percentile) indicating the client is reporting 50% impairment in functioning across domains. Client's partner completed the collateral rating scale, which indicated similar results.      Mukul Deficits in Executive Functioning Scale (BDEFS)  The BDEFS is a measure used for evaluating dimensions of adult executive functioning in daily life. This assessment measure includes self and collateral rating scales. Self-report scores are reported as percentiles. Scores at the 76th-83rd percentile are considered marginal, scores at the 84th-92nd percentile are considered borderline, scores at the 93rd-95th percentile are considered mild, scores at the 96th-98th percentile are considered moderate, and those at the 99th percentile are considered severe. Collateral or \"other\" rating scales are reported as number of symptoms observed in comparison to those reported by the client. Norms and percentile scores are not available for collateral reports.     Results indicate the client's Total Executive Functioning Score was 263 (99+ percentile). The ADHD-Executive Functioning Index score was 32 (98th percentile). " These scores suggest the client has moderate deficits in executive functioning. Results indicate the client identified significant deficits in the following areas: self-management to time (severe deficits), self-organization/problem-solving (severe deficits), self-restraint (no significant deficits), self-motivation (severe deficits) and self-regulation of emotions (moderate deficits). Client's partner completed the collateral rating scale, which indicated similar results.     Generalized Anxiety Disorder Questionnaire (SEN-7)  This questionnaire is designed to assess for anxiety in adults. Based on the score, the patient is experiencing moderate symptoms of anxiety. Client identified the following symptoms of anxiety: feeling on edge/nervous/anxious, difficulty controlling worry, worrying about many different things, trouble relaxing, becoming easily annoyed or irritable, and feeling something awful might happen.    Patient Health Questionnaire- 9 (PHQ-9)   This questionnaire is designed to assess for depression in adults. Based on the score, the patient is experiencing moderate symptoms of depression. Client identified the following symptoms of depression: depressed mood, lack of interest, difficulty with sleep, feeling tired or having little energy, feeling bad about self, poor concentration, and restlessness or lethargy.    SUMMARY: Evelin Thurman is a 23-year-old White woman who completed psychological testing remotely/virtually. Testing was requested to provide updated diagnostic clarification and necessary treatment recommendations.    Patient first completed a diagnostic interview in which mental health symptoms, ADHD symptoms, and background information was gathered. Patient self-reported six symptoms of inattention and indicated that her abilities to function effectively at home and work are significantly impaired. Further, her self-reported symptoms on Mukul measures of ADHD symptoms were consistent with  this information.  Both the patient and her mother denied a significant history of symptoms in childhood.  The patient cited problems as beginning at 18 years old.    An objective measure of neurocognitive functioning was also administered.  Results first indicated verbal memory to be scored in the overall average range, as the patient was able to recognize target words from a word list immediately and after a delay in the average range.  Visual memory score to be in the overall average range.  However, patient was able to recognize target shapes immediately and after a delay in the low average range.  Motor functioning appears to be intact, as motor speed and psychomotor speed were both scored to be in the above average range.  Reasoning scored to be in the average range, as the patient was able to solve nonverbal puzzle matrices comparable to peers.  Processing speed score to be in the average range, as the patient was able to scan and respond to visual stimuli comparable to peers.  On the Stroop test, the patient was able to inhibit the salient, but incorrect, response in the average range.  On a test of shifting attention, she was able to adjust her responses according to changing rules sets in the average range.  As such, complex attention, cognitive flexibility, and executive functioning were all scored to be in the average range.  On a test of continuous performance, the patient was able to hold her attention and resist making impulsive mistakes in the average range; simple attention scored to be in the average range.  On a more complex continuous performance test that included one-back and two-back components, the patient was able to sustain her attention across time and hold information in mind for short-term manipulation in the above average range.  On CNS Vital Signs, ADHD is associated with significant deficits in attention, processing speed, and executive functioning capabilities.  The patient did not  demonstrate problems in any of these areas, consistent with a lack of childhood history of symptoms.  As such problems cannot be conceptualized as having a neurodevelopmental basis.  See recommendations below.    Referral Question Response: DSM-5 criteria for ADHD:   A. Symptom Count - Are there sufficient symptoms for the diagnosis? Yes, patient did endorse sufficient inattention symptoms.   B. Onset - Were several symptoms present before 12 years of age?  No, patient reported symptoms to begin around 18 years old.  C. Pervasiveness - Are several symptoms present in at least two settings? Yes, patient reported that symptoms are problematic at home, school, and work.   D. Impairment - Do symptoms interfere with or reduce the quality of functioning? Yes, patient is unable to complete daily tasks effectively.   E. Exclusions - Are symptoms better explained by another disorder or factor? Yes, symptoms are better explained by anxiety and depression symptoms. Difficulties are not explained by an organic basis of inattention.     The patient meets the following DSM-5 criteria for major depressive disorder:  A. Five (or more) symptoms have been present during the same 2-week period and represent a change from previous functioning; at least one of the symptoms is either (1) depressed mood or (2) loss of interest or pleasure.   Depressed mood.   Diminished interest or pleasure in all, or almost all, activities.   Significant sleep change.   Fatigue or loss of energy.   Feelings of worthlessness or inappropriate guilt.   Diminished ability to think or concentrate, or indecisiveness.   Psychomotor agitation or lethargy.  B. The symptoms cause clinically significant distress or impairment in social, occupational, or other important areas of functioning.  C. The episode is not attributable to the physiological effects of a substance or to another medical condition.  D. The occurrence of major depressive episode is not better  explained by other thought / psychotic disorders.  E. There has never been a manic episode or hypomanic episode.     The patient also meets the following DSM-5 criteria for generalized anxiety disorder:  A. Excessive anxiety and worry, occurring more days than not for at least 6 months about a number of events or activities.   B. The individual finds it difficult to control the worry.  C. The anxiety and worry are associated with 3 or more of 6 symptoms.  D. The anxiety, worry, or physical symptoms cause clinically significant distress or impairment in social, occupational, or other important areas of functioning.  E. The disturbance is not attributable to the physiological effects of a substance (e.g., a drug of abuse, a medication) or another medical condition (e.g., hyperthyroidism).  F. The disturbance is not better explained by another mental disorder.    DIAGNOSES:  F33.1 Major depressive disorder, recurrent episode, moderate  F41.1 Generalized anxiety disorder    PLAN OF CARE:  Continue psychotropic medication as prescribed.    Consider initiating individual psychotherapy to help alleviate mood symptoms. Research indicates that outcomes are best with both medication and therapy. You can call the [facility] line at [phone].     RECOMMENDATIONS:  Due to the patient's reported attention, concentration, and mood difficulties, the following health/lifestyle changes when combined, can significantly improve symptoms:   Avoid simple carbohydrates at breakfast. Aim for only complex carbohydrates and lean protein for your morning meal.   Engage in aerobic exercise 3 times per week for 30 minutes, ensuring that your heart rate stays within your training zone. Further, reading the book,  Spark,  by Mike Costello M.D can help the patient understand the benefits of exercise on the brain.   Research suggest that taking a high-quality multi-vitamin and antioxidant (1/2 cup of blueberries) daily in  conjunction with balanced nutrition can be helpful.  Aim for the high end of daily water intake: around 72 ounces per day.  Ensure regular meals and snacks to maintain optimal attention.    The following may be beneficial in managing some of the patient's attention and concentration difficulties:  Due to the patient's difficulties with attention and concentration, consider working in a completely distraction-free area while completing tasks. Workspaces should be completely clear except for the materials needed for the current task. Both visual and auditory distractions should be decreased as much as possible.  Considering decreased ability to focus and maintain attention, it is recommended that the patient take frequent breaks while completing tasks. This will help to maintain attention and effort. The patient may benefit from the use of a [employer] Timer. The timer works by using built-in break times. After working on a task consistently for 25 minutes, the timer reminds the user to take a five-minute break before continuing, etc. A [employer] timer can be downloaded as a free caity to a phone or tablet.  Due to the patient's attentional and concentration symptoms, it is recommended to increase organization with the use of lists and calendars. Significantly increasing structure to the day and adhering to a set schedule can increase your ability to complete responsibilities, track deadline, etc. Breaking these tasks down into their component parts and recording them in a calendar/planner will likely be beneficial. Patient would benefit from setting feasible timelines for completion of activities. By establishing clear priorities for completing tasks, you can more likely complete the most important tasks first. The patient may also choose to elect to a friend or family member to help hold them accountable.    Avoid multitasking. Attempting to work on multiple tasks and projects the same increases the likelihood that an  "error will occur. Focus on one task at a time.    The patient may benefit from engaging in mindfulness practices. This may include breathing techniques, apps that provide guided meditation, or more interactive activities such as coloring.    Develop a \"coping skills jar/box.\" This entails designating a certain container to hold slips of paper with distraction technique ideas written on each slip of paper. Distraction techniques may include listening to a certain type of music, playing on game on your phone, doing a breathing exercise, spending time with a pet, calling a certain individual, looking at a magazine, working on a puzzle, etc. When feeling distressed, choose a slip of paper from the container and engage in that activity rather than focusing on the problem.      Katiuska David PsyD,   Clinical Psychologist    "

## 2024-03-12 ENCOUNTER — OFFICE VISIT (OUTPATIENT)
Dept: FAMILY MEDICINE | Facility: CLINIC | Age: 24
End: 2024-03-12
Payer: COMMERCIAL

## 2024-03-12 VITALS
HEIGHT: 67 IN | SYSTOLIC BLOOD PRESSURE: 103 MMHG | TEMPERATURE: 97.4 F | DIASTOLIC BLOOD PRESSURE: 73 MMHG | BODY MASS INDEX: 24.17 KG/M2 | RESPIRATION RATE: 16 BRPM | OXYGEN SATURATION: 98 % | HEART RATE: 66 BPM | WEIGHT: 154 LBS

## 2024-03-12 DIAGNOSIS — N76.0 BV (BACTERIAL VAGINOSIS): ICD-10-CM

## 2024-03-12 DIAGNOSIS — F33.0 MILD EPISODE OF RECURRENT MAJOR DEPRESSIVE DISORDER (H): Primary | ICD-10-CM

## 2024-03-12 DIAGNOSIS — F41.1 GAD (GENERALIZED ANXIETY DISORDER): ICD-10-CM

## 2024-03-12 DIAGNOSIS — R05.1 ACUTE COUGH: ICD-10-CM

## 2024-03-12 DIAGNOSIS — R41.840 ATTENTION DEFICIT: ICD-10-CM

## 2024-03-12 DIAGNOSIS — B96.89 BV (BACTERIAL VAGINOSIS): ICD-10-CM

## 2024-03-12 PROBLEM — F33.9 MAJOR DEPRESSION, RECURRENT (H): Status: ACTIVE | Noted: 2024-03-12

## 2024-03-12 LAB
CLUE CELLS: PRESENT
DEPRECATED S PYO AG THROAT QL EIA: NEGATIVE
FLUAV AG SPEC QL IA: NEGATIVE
FLUBV AG SPEC QL IA: NEGATIVE
GROUP A STREP BY PCR: NOT DETECTED
TRICHOMONAS, WET PREP: ABNORMAL
WBC'S/HIGH POWER FIELD, WET PREP: ABNORMAL
YEAST, WET PREP: ABNORMAL

## 2024-03-12 PROCEDURE — 99214 OFFICE O/P EST MOD 30 MIN: CPT | Performed by: PHYSICIAN ASSISTANT

## 2024-03-12 PROCEDURE — 87635 SARS-COV-2 COVID-19 AMP PRB: CPT | Performed by: PHYSICIAN ASSISTANT

## 2024-03-12 PROCEDURE — 87651 STREP A DNA AMP PROBE: CPT | Performed by: PHYSICIAN ASSISTANT

## 2024-03-12 PROCEDURE — 87210 SMEAR WET MOUNT SALINE/INK: CPT | Performed by: PHYSICIAN ASSISTANT

## 2024-03-12 PROCEDURE — 87804 INFLUENZA ASSAY W/OPTIC: CPT | Performed by: PHYSICIAN ASSISTANT

## 2024-03-12 PROCEDURE — 87804 INFLUENZA ASSAY W/OPTIC: CPT | Mod: 59 | Performed by: PHYSICIAN ASSISTANT

## 2024-03-12 RX ORDER — FLUOXETINE 10 MG/1
CAPSULE ORAL
Qty: 21 CAPSULE | Refills: 0 | Status: SHIPPED | OUTPATIENT
Start: 2024-03-12 | End: 2024-07-29

## 2024-03-12 RX ORDER — METRONIDAZOLE 500 MG/1
500 TABLET ORAL 2 TIMES DAILY
Qty: 14 TABLET | Refills: 0 | Status: SHIPPED | OUTPATIENT
Start: 2024-03-12 | End: 2024-03-19

## 2024-03-12 RX ORDER — SERTRALINE HYDROCHLORIDE 25 MG/1
25 TABLET, FILM COATED ORAL DAILY
Qty: 60 TABLET | Refills: 0 | Status: SHIPPED | OUTPATIENT
Start: 2024-03-12 | End: 2024-06-03

## 2024-03-12 ASSESSMENT — ANXIETY QUESTIONNAIRES
1. FEELING NERVOUS, ANXIOUS, OR ON EDGE: NEARLY EVERY DAY
2. NOT BEING ABLE TO STOP OR CONTROL WORRYING: NEARLY EVERY DAY
7. FEELING AFRAID AS IF SOMETHING AWFUL MIGHT HAPPEN: NEARLY EVERY DAY
GAD7 TOTAL SCORE: 15
7. FEELING AFRAID AS IF SOMETHING AWFUL MIGHT HAPPEN: NEARLY EVERY DAY
3. WORRYING TOO MUCH ABOUT DIFFERENT THINGS: MORE THAN HALF THE DAYS
GAD7 TOTAL SCORE: 15
8. IF YOU CHECKED OFF ANY PROBLEMS, HOW DIFFICULT HAVE THESE MADE IT FOR YOU TO DO YOUR WORK, TAKE CARE OF THINGS AT HOME, OR GET ALONG WITH OTHER PEOPLE?: VERY DIFFICULT
4. TROUBLE RELAXING: SEVERAL DAYS
IF YOU CHECKED OFF ANY PROBLEMS ON THIS QUESTIONNAIRE, HOW DIFFICULT HAVE THESE PROBLEMS MADE IT FOR YOU TO DO YOUR WORK, TAKE CARE OF THINGS AT HOME, OR GET ALONG WITH OTHER PEOPLE: VERY DIFFICULT
GAD7 TOTAL SCORE: 15
6. BECOMING EASILY ANNOYED OR IRRITABLE: NEARLY EVERY DAY
5. BEING SO RESTLESS THAT IT IS HARD TO SIT STILL: NOT AT ALL

## 2024-03-12 ASSESSMENT — PATIENT HEALTH QUESTIONNAIRE - PHQ9
SUM OF ALL RESPONSES TO PHQ QUESTIONS 1-9: 21
10. IF YOU CHECKED OFF ANY PROBLEMS, HOW DIFFICULT HAVE THESE PROBLEMS MADE IT FOR YOU TO DO YOUR WORK, TAKE CARE OF THINGS AT HOME, OR GET ALONG WITH OTHER PEOPLE: EXTREMELY DIFFICULT
SUM OF ALL RESPONSES TO PHQ QUESTIONS 1-9: 21

## 2024-03-12 NOTE — PROGRESS NOTES
Assessment & Plan     Mild episode of recurrent major depressive disorder (H24)    Not well controlled. No significant life changes. Will taper off prozac while starting sertraline. Follow-up with phone visit in 4-6 weeks.    - FLUoxetine (PROZAC) 10 MG capsule; Take 2 capsules (20 mg) by mouth daily for 7 days, THEN 1 capsule (10 mg) daily for 7 days.  - sertraline (ZOLOFT) 25 MG tablet; Take 1 tablet (25 mg) by mouth daily  [facility]; Future      SEN (generalized anxiety disorder)    See above.    - FLUoxetine (PROZAC) 10 MG capsule; Take 2 capsules (20 mg) by mouth daily for 7 days, THEN 1 capsule (10 mg) daily for 7 days.  - sertraline (ZOLOFT) 25 MG tablet; Take 1 tablet (25 mg) by mouth daily      BV (bacterial vaginosis)    Treat with flagyl.    - Wet prep - lab collect; Future  - Wet prep - lab collect      Cough    Likely viral. Supportive cares with over the counter medications as needed.    - Symptomatic COVID-19 Virus (Coronavirus) by PCR Nose  - Influenza A & B Antigen - Clinic Collect  - Streptococcus A Rapid Screen w/Reflex to PCR - Clinic Collect  - Group A Streptococcus PCR Throat Swab      Attention deficit    Will refer for testing again. Previously told that she had all the symptoms but since they didn't start before age 12, couldn't be diagnosed. Told her that we cannot prescribe medication without a diagnosis.    - Adult Mental [facility]  Referral; Future            Depression Screening Follow Up        3/12/2024     2:02 AM   PHQ   PHQ-9 Total Score 21   Q9: Thoughts of better off dead/self-harm past 2 weeks Not at all         3/12/2024     2:02 AM   Last PHQ-9   1.  Little interest or pleasure in doing things 3   2.  Feeling down, depressed, or hopeless 2   3.  Trouble falling or staying asleep, or sleeping too much 3   4.  Feeling tired or having little energy 3   5.  Poor appetite or overeating 2   6.  Feeling bad about yourself 2   7.  Trouble  concentrating 3   8.  Moving slowly or restless 3   Q9: Thoughts of better off dead/self-harm past 2 weeks 0   PHQ-9 Total Score 21       Follow Up Actions Taken  Crisis resource information provided in After Visit Summary  Patient counseled, no additional follow up at this time.  Adjusted patient's anti-depressant medication.           Veronica Waters is a 24 year old, presenting for the following health issues:  Recheck Medication and Vaginal Problem        3/12/2024    10:50 AM   Additional Questions   Roomed by Cristine     Vaginal Problem     History of Present Illness       Mental Health Follow-up:  Patient presents to follow-up on Depression & Anxiety.Patient's depression since last visit has been:  Worse  The patient is having other symptoms associated with depression.  Patient's anxiety since last visit has been:  No change  The patient is having other symptoms associated with anxiety.  Any significant life events: relationship concerns, job concerns, financial concerns, housing concerns and other  Patient is feeling anxious or having panic attacks.  Patient has no concerns about alcohol or drug use.    Reason for visit:  Vaginal abnormalities and depression  Symptom onset:  1-2 weeks ago  Symptoms include:  Odd smelling / colored discharge, pain during sex  Symptom intensity:  Mild  Symptom progression:  Staying the same  Had these symptoms before:  No  What makes it worse:  Sex  What makes it better:  No    She eats 4 or more servings of fruits and vegetables daily.She consumes 0 sweetened beverage(s) daily.She exercises with enough effort to increase her heart rate 60 or more minutes per day.  She exercises with enough effort to increase her heart rate 6 days per week.   She is not taking prescribed medications regularly due to remembering to take.       Acute Illness    Onset/Duration: today  Symptoms:  Fever: No  Chills/Sweats: No  Headache (location?): YES  Sinus Pressure: YES  Conjunctivitis:   "No  Ear Pain: no  Rhinorrhea: YES  Congestion: YES  Sore Throat: No  Cough: YES  Wheeze: No  Decreased Appetite: No  Nausea: No  Vomiting: No  Diarrhea: No  Dysuria/Freq.: No  Dysuria or Hematuria: No  Fatigue/Achiness: YES  Sick/Strep Exposure: Yes  Therapies tried and outcome: Dayquil    Vaginal Symptoms  Onset/Duration: 2 weeks  Description:  Vaginal Discharge: creamy   Itching (Pruritis): No  Burning sensation:  No  Odor: YES  Accompanying Signs & Symptoms:  Urinary symptoms: No  Abdominal pain: No  Fever: No  History:   Sexually active: YES  New Partner: YES  Possibility of Pregnancy:  No  Recent antibiotic use: No  Previous vaginitis issues: Yes  Precipitating or alleviating factors: None  Therapies tried and outcome: none        Review of Systems  Constitutional, HEENT, cardiovascular, pulmonary, gi and gu systems are negative, except as otherwise noted.        Objective    /73 (BP Location: Right arm, Patient Position: Chair, Cuff Size: Adult Regular)   Pulse 66   Temp 97.4  F (36.3  C) (Oral)   Resp 16   Ht 1.689 m (5' 6.5\")   Wt 69.9 kg (154 lb)   SpO2 98%   BMI 24.48 kg/m    Body mass index is 24.48 kg/m .      Physical Exam   GENERAL: alert and no distress  EYES: Eyes grossly normal to inspection, PERRL and conjunctivae and sclerae normal  HENT: ear canals and TM's normal, nose and mouth without ulcers or lesions  RESP: lungs clear to auscultation - no rales, rhonchi or wheezes  CV: regular rate and rhythm, normal S1 S2, no S3 or S4, no murmur, click or rub, no peripheral edema  MS: no gross musculoskeletal defects noted, no edema  SKIN: no suspicious lesions or rashes  NEURO: Normal strength and tone, mentation intact and speech normal  PSYCH: mentation appears normal, affect normal/bright    Results for orders placed or performed in visit on 03/12/24 (from the past 24 hour(s))   Influenza A & B Antigen - Clinic Collect    Specimen: Nose; Swab   Result Value Ref Range    Influenza A antigen " Negative Negative    Influenza B antigen Negative Negative    Narrative    Test results must be correlated with clinical data. If necessary, results should be confirmed by a molecular assay or viral culture.   Streptococcus A Rapid Screen w/Reflex to PCR - Clinic Collect    Specimen: Throat; Swab   Result Value Ref Range    Group A Strep antigen Negative Negative   Wet prep - lab collect    Specimen: Vagina; Swab   Result Value Ref Range    Trichomonas Absent Absent    Yeast Absent Absent    Clue Cells Present (A) Absent    WBCs/high power field 2+ (A) None           Signed Electronically by: Isaac Leong PA-C

## 2024-03-13 LAB — SARS-COV-2 RNA RESP QL NAA+PROBE: NEGATIVE

## 2024-03-23 ENCOUNTER — NURSE TRIAGE (OUTPATIENT)
Dept: NURSING | Facility: CLINIC | Age: 24
End: 2024-03-23
Payer: COMMERCIAL

## 2024-03-23 NOTE — TELEPHONE ENCOUNTER
Per pt, she was backpacking in the last 3-5 days and has a blister on foot. Per pt, she is currently in [place].Pt is informed that [facility] is not able to triage pt that is outside of [place]. Pt verbalized understanding and agreement and no further questions at this time.   Additional Information   General information question, no triage required and triager able to answer question    Protocols used: Information Only Call-A-AH

## 2024-03-23 NOTE — TELEPHONE ENCOUNTER
"Todd states she has a blister that may be infected: on her right foot. It began a couple of days ago. The skin is still on, but she \"popped it\". It is red around the blister. The drainage is clear yellow. Slightly swollen around it. Painful=\"7\" if walking, if not waling, pain=\"1-2\". She has been using triple antibiotic ointment since last night. She began soaking it in Epsom salts today. The blister is the size of a dime or slightly larger. She has additional intact blisters on both feet: not painful. The red area is approx 1/2\" around the blister. She had been hiking when she got the blisters.     Triaged to a disposition of Home Care: given per guideline.    Juliana Gu RN Triage Nurse Advisor 3:46 PM 3/23/2024    Reason for Disposition   Normal blister from friction    Additional Information   Negative: Followed a thermal burn (e.g., hot object, scald from boiling water)   Negative: Followed a chemical burn   Negative: Followed frostbite   Negative: Poison ivy, oak, or sumac rash suspected (e.g., itchy rash after contact with poison ivy)   Negative: Shingles suspected (i.e., painful rash, multiple small blisters grouped together in one area of body; dermatomal distribution)   Negative: [1] Diabetes mellitus AND [2] boil, ulcer, or infection of foot   Negative: Blisters on other parts of the body (besides hands and feet)   Negative: Patient sounds very sick or weak to the triager   Negative: [1] Looks infected (spreading redness, red streak, pus) AND [2] fever   Negative: [1] Looks infected AND [2] large red area (> 2 in. or 5 cm)   Negative: [1] Foul-smelling odor from foot AND [2] new or increased   Negative: [1] Purple or black skin on foot or toe AND [2] new or increased   Negative: Looks like a boil or deep ulcer   Negative: [1] Looks infected (spreading redness, red streak, pus) AND [2] no fever   Negative: [1] Blister on foot AND [2] diabetes mellitus or peripheral vascular disease (\"poor " "circulation\")   Negative: [1] Severe pain or large blister AND [2] caller wants doctor to drain blister   Negative: [1] Cause unknown AND [2] painful blister on fingertip   Negative: [1] Cause unknown AND [2] new blisters are developing   Negative: [1] Cause unknown AND [2] no new blisters   Negative: [1] Hand blisters are a chronic problem (recurrent or ongoing AND present > 4 weeks) AND [2] cause unknown   Negative: Normal hot spot from friction    Protocols used: Blister - Foot and Hand-A-AH    "

## 2024-06-01 DIAGNOSIS — F33.0 MILD EPISODE OF RECURRENT MAJOR DEPRESSIVE DISORDER (H): ICD-10-CM

## 2024-06-01 DIAGNOSIS — F41.1 GAD (GENERALIZED ANXIETY DISORDER): ICD-10-CM

## 2024-06-03 RX ORDER — SERTRALINE HYDROCHLORIDE 25 MG/1
25 TABLET, FILM COATED ORAL DAILY
Qty: 30 TABLET | Refills: 0 | Status: SHIPPED | OUTPATIENT
Start: 2024-06-03 | End: 2024-07-16

## 2024-06-03 NOTE — TELEPHONE ENCOUNTER
Spoke to patient, she will call back and schedule a follow up.     Pattie Carballo, IRWIN on 6/3/2024 at 1:46 PM

## 2024-06-03 NOTE — TELEPHONE ENCOUNTER
Please call patient. Sending 30 days but overdue for phone visit follow-up. Please help schedule.    Isaac Leong PA-C on 6/3/2024 at 11:42 AM

## 2024-07-15 DIAGNOSIS — F33.0 MILD EPISODE OF RECURRENT MAJOR DEPRESSIVE DISORDER (H): ICD-10-CM

## 2024-07-15 DIAGNOSIS — F41.1 GAD (GENERALIZED ANXIETY DISORDER): ICD-10-CM

## 2024-07-16 RX ORDER — SERTRALINE HYDROCHLORIDE 25 MG/1
25 TABLET, FILM COATED ORAL DAILY
Qty: 90 TABLET | Refills: 1 | Status: SHIPPED | OUTPATIENT
Start: 2024-07-16

## 2024-07-29 ENCOUNTER — VIRTUAL VISIT (OUTPATIENT)
Dept: FAMILY MEDICINE | Facility: CLINIC | Age: 24
End: 2024-07-29
Payer: COMMERCIAL

## 2024-07-29 DIAGNOSIS — F41.1 GAD (GENERALIZED ANXIETY DISORDER): Primary | ICD-10-CM

## 2024-07-29 DIAGNOSIS — F33.0 MILD EPISODE OF RECURRENT MAJOR DEPRESSIVE DISORDER (H): ICD-10-CM

## 2024-07-29 DIAGNOSIS — R11.0 NAUSEA: ICD-10-CM

## 2024-07-29 PROCEDURE — 99442 PR PHYSICIAN TELEPHONE EVALUATION 11-20 MIN: CPT | Mod: 93 | Performed by: PHYSICIAN ASSISTANT

## 2024-07-29 RX ORDER — SERTRALINE HYDROCHLORIDE 25 MG/1
50 TABLET, FILM COATED ORAL DAILY
Qty: 120 TABLET | Refills: 0 | Status: SHIPPED | OUTPATIENT
Start: 2024-07-29 | End: 2024-09-27

## 2024-07-29 RX ORDER — ONDANSETRON 4 MG/1
TABLET, FILM COATED ORAL
Qty: 30 TABLET | Refills: 0 | Status: SHIPPED | OUTPATIENT
Start: 2024-07-29

## 2024-07-29 ASSESSMENT — PATIENT HEALTH QUESTIONNAIRE - PHQ9
SUM OF ALL RESPONSES TO PHQ QUESTIONS 1-9: 17
SUM OF ALL RESPONSES TO PHQ QUESTIONS 1-9: 17
10. IF YOU CHECKED OFF ANY PROBLEMS, HOW DIFFICULT HAVE THESE PROBLEMS MADE IT FOR YOU TO DO YOUR WORK, TAKE CARE OF THINGS AT HOME, OR GET ALONG WITH OTHER PEOPLE: VERY DIFFICULT

## 2024-07-29 ASSESSMENT — ANXIETY QUESTIONNAIRES
3. WORRYING TOO MUCH ABOUT DIFFERENT THINGS: NEARLY EVERY DAY
5. BEING SO RESTLESS THAT IT IS HARD TO SIT STILL: NOT AT ALL
7. FEELING AFRAID AS IF SOMETHING AWFUL MIGHT HAPPEN: MORE THAN HALF THE DAYS
IF YOU CHECKED OFF ANY PROBLEMS ON THIS QUESTIONNAIRE, HOW DIFFICULT HAVE THESE PROBLEMS MADE IT FOR YOU TO DO YOUR WORK, TAKE CARE OF THINGS AT HOME, OR GET ALONG WITH OTHER PEOPLE: VERY DIFFICULT
4. TROUBLE RELAXING: SEVERAL DAYS
8. IF YOU CHECKED OFF ANY PROBLEMS, HOW DIFFICULT HAVE THESE MADE IT FOR YOU TO DO YOUR WORK, TAKE CARE OF THINGS AT HOME, OR GET ALONG WITH OTHER PEOPLE?: VERY DIFFICULT
GAD7 TOTAL SCORE: 14
7. FEELING AFRAID AS IF SOMETHING AWFUL MIGHT HAPPEN: MORE THAN HALF THE DAYS
2. NOT BEING ABLE TO STOP OR CONTROL WORRYING: NEARLY EVERY DAY
GAD7 TOTAL SCORE: 14
1. FEELING NERVOUS, ANXIOUS, OR ON EDGE: NEARLY EVERY DAY
6. BECOMING EASILY ANNOYED OR IRRITABLE: MORE THAN HALF THE DAYS
GAD7 TOTAL SCORE: 14

## 2024-07-29 ASSESSMENT — ENCOUNTER SYMPTOMS: NERVOUS/ANXIOUS: 1

## 2024-07-29 NOTE — PROGRESS NOTES
Evelin is a 24 year old who is being evaluated via a billable telephone visit.    What phone number would you like to be contacted at? [phone]  How would you like to obtain your AVS? [employer]  Originating Location (pt. Location): Home    Distant Location (provider location):  Off-site    Assessment & Plan     SEN (generalized anxiety disorder)  Mild episode of recurrent major depressive disorder (H24)  Patient is a 24-year-old female who presents to clinic requesting refill of sertraline.  Dose was increased to 25 mg 4 weeks ago.  Patient notes approximately 10% improvement in symptoms.  No SI.  Discussed dose management and patient agreeable to increasing to 50 mg daily.  Refill provided.  Discussed benefits of counseling and referral placed.  Discussed concerns for inattention and how anxiety may be playing a role in the symptoms.  Will proceed with depression/anxiety management prior to further ADHD evaluation.  Patient to follow-up in 1 month for recheck.  Sooner follow-up for any new or worsening symptoms.  - Adult Mental Health  Referral; Future  - sertraline (ZOLOFT) 25 MG tablet; Take 2 tablets (50 mg) by mouth daily for 60 days      Nausea  Patient notes nausea related to anxiety.  She uses Zofran to manage her symptoms and takes this approximately once per week.  No side effects.  Refill provided.  - ondansetron (ZOFRAN) 4 MG tablet; TAKE 1 TABLET(4 MG) BY MOUTH EVERY 6 HOURS AS NEEDED FOR NAUSEA OR VOMITING      See Patient Instructions    Subjective   Evelin is a 24 year old, presenting for the following health issues:  RECHECK      7/29/2024     8:13 AM   Additional Questions   Roomed by Patient completed echeck in via Filtr[employer]     History of Present Illness       Reason for visit:  Med check / prescription refill    She eats 2-3 servings of fruits and vegetables daily.She consumes 1 sweetened beverage(s) daily.She exercises with enough effort to increase her heart rate 30 to 60 minutes per  day.  She exercises with enough effort to increase her heart rate 4 days per week. She is missing 2 dose(s) of medications per week.  She is not taking prescribed medications regularly due to remembering to take.     Patient notes she needs refill of medication. She switched medication for anxiety/depression in April. She is currently on Sertraline. She started at 10mg and forgot to take it a few times. One month ago she increased to 25mg and feels that this is helping. She feels around 10% better. Patient is in [place] currently, but will be in [place] in a few days.     Patient has dealt with anxiety/depression since age 17. She has been in counseling in the past, but is not currently in it as she has had some trouble with insurance coverage. She is interested in restarting this.     Patient also notes severe nausea related to anxiety. She uses Zofran around 1X per week to manage this.     Patient was referred to psychiatry and was seen by them in the past. She was told she had symptoms of ADHD but could not be diagnosed as she did not have symptoms prior to the age of 12. She is wondering if she has ADHD and needs medication for this.     PATIENT HEALTH QUESTIONNAIRE-9 (PHQ - 9)    Over the last 2 weeks, how often have you been bothered by any of the following problems?    1. Little interest or pleasure in doing things -  More than half the days   2. Feeling down, depressed, or hopeless -  More than half the days   3. Trouble falling or staying asleep, or sleeping too much - More than half the days   4. Feeling tired or having little energy -  Nearly every day   5. Poor appetite or overeating -  Several days   6. Feeling bad about yourself - or that you are a failure or have let yourself or your family down -  Nearly every day   7. Trouble concentrating on things, such as reading the newspaper or watching television - More than half the days   8. Moving or speaking so slowly that other people could have noticed? Or the  opposite - being so fidgety or restless that you have been moving around a lot more than usual More than half the days   9. Thoughts that you would be better off dead or of hurting  yourself in some way Not at all   Total Score: 17     If you checked off any problems, how difficult have these problems made it for you to do your work, take care of things at home, or get along with other people?      Developed by Alfa Sandy, Pattie Rodriguez, Fermin Hong and colleagues, with an educational ridge from [employer] No permission required to reproduce, translate, display or distribute. permission required to reproduce, translate, display or distribute.      Review of Systems   Psychiatric/Behavioral:  Negative for self-injury and suicidal ideas. The patient is nervous/anxious.            Objective           Vitals:  No vitals were obtained today due to virtual visit.    Physical Exam   General: Alert and no distress //Respiratory: No audible wheeze, cough, or shortness of breath // Psychiatric:  Appropriate affect, tone, and pace of words          Phone call duration: 16 minutes  Signed Electronically by: Pat Salazar PA-C

## 2024-07-29 NOTE — PATIENT INSTRUCTIONS
Jeison Waters, you have been provided with refills of sertraline/Zoloft and ondansetron/Zofran.  We increased your dose of sertraline to 50 mg daily.  You should notice benefit with this in approximately 6-8 weeks.  I also placed a referral to counseling and scheduling will call you to set up your appointment.  Please follow-up in 1 month as scheduled.  Follow-up sooner for any new or worsening symptoms and reach out with questions or concerns.    Take Care,  Pat Salazar PA-C

## 2024-08-29 ASSESSMENT — ANXIETY QUESTIONNAIRES
GAD7 TOTAL SCORE: 11
8. IF YOU CHECKED OFF ANY PROBLEMS, HOW DIFFICULT HAVE THESE MADE IT FOR YOU TO DO YOUR WORK, TAKE CARE OF THINGS AT HOME, OR GET ALONG WITH OTHER PEOPLE?: SOMEWHAT DIFFICULT
4. TROUBLE RELAXING: SEVERAL DAYS
GAD7 TOTAL SCORE: 11
IF YOU CHECKED OFF ANY PROBLEMS ON THIS QUESTIONNAIRE, HOW DIFFICULT HAVE THESE PROBLEMS MADE IT FOR YOU TO DO YOUR WORK, TAKE CARE OF THINGS AT HOME, OR GET ALONG WITH OTHER PEOPLE: SOMEWHAT DIFFICULT
5. BEING SO RESTLESS THAT IT IS HARD TO SIT STILL: NOT AT ALL
6. BECOMING EASILY ANNOYED OR IRRITABLE: NEARLY EVERY DAY
2. NOT BEING ABLE TO STOP OR CONTROL WORRYING: SEVERAL DAYS
1. FEELING NERVOUS, ANXIOUS, OR ON EDGE: MORE THAN HALF THE DAYS
7. FEELING AFRAID AS IF SOMETHING AWFUL MIGHT HAPPEN: MORE THAN HALF THE DAYS
3. WORRYING TOO MUCH ABOUT DIFFERENT THINGS: MORE THAN HALF THE DAYS
GAD7 TOTAL SCORE: 11
7. FEELING AFRAID AS IF SOMETHING AWFUL MIGHT HAPPEN: MORE THAN HALF THE DAYS

## 2024-08-30 ENCOUNTER — VIRTUAL VISIT (OUTPATIENT)
Dept: FAMILY MEDICINE | Facility: CLINIC | Age: 24
End: 2024-08-30
Payer: COMMERCIAL

## 2024-08-30 DIAGNOSIS — F33.0 MILD EPISODE OF RECURRENT MAJOR DEPRESSIVE DISORDER (H): ICD-10-CM

## 2024-08-30 DIAGNOSIS — F41.1 GAD (GENERALIZED ANXIETY DISORDER): Primary | ICD-10-CM

## 2024-08-30 PROCEDURE — 99213 OFFICE O/P EST LOW 20 MIN: CPT | Mod: 95 | Performed by: PHYSICIAN ASSISTANT

## 2024-08-30 ASSESSMENT — ENCOUNTER SYMPTOMS
FEVER: 0
NERVOUS/ANXIOUS: 0

## 2024-08-30 NOTE — PATIENT INSTRUCTIONS
A refill of sertraline/Zoloft has been sent to your pharmacy.  As discussed, you will need to set up the new provider once you are settled in [place] and for any additional refills.  Also, counseling can be very helpful in managing depression/anxiety and changes that come with moving.  I would recommend setting counseling up once you have a chance.  Please reach out with any questions or concerns along the way and good luck with your move.

## 2024-09-25 ENCOUNTER — PATIENT OUTREACH (OUTPATIENT)
Dept: CARE COORDINATION | Facility: CLINIC | Age: 24
End: 2024-09-25
Payer: COMMERCIAL

## 2024-10-07 ENCOUNTER — PATIENT OUTREACH (OUTPATIENT)
Dept: FAMILY MEDICINE | Facility: CLINIC | Age: 24
End: 2024-10-07
Payer: COMMERCIAL

## 2024-10-07 DIAGNOSIS — R87.610 ATYPICAL SQUAMOUS CELLS OF UNDETERMINED SIGNIFICANCE (ASCUS) ON PAPANICOLAOU SMEAR OF CERVIX: Primary | ICD-10-CM

## 2024-10-09 ENCOUNTER — PATIENT OUTREACH (OUTPATIENT)
Dept: CARE COORDINATION | Facility: CLINIC | Age: 24
End: 2024-10-09
Payer: COMMERCIAL

## 2024-12-29 ENCOUNTER — HEALTH MAINTENANCE LETTER (OUTPATIENT)
Age: 24
End: 2024-12-29

## 2025-01-31 PROBLEM — F33.1 MAJOR DEPRESSIVE DISORDER, RECURRENT EPISODE, MODERATE (H): Status: ACTIVE | Noted: 2024-03-12

## 2025-04-23 ENCOUNTER — PATIENT OUTREACH (OUTPATIENT)
Dept: CARE COORDINATION | Facility: CLINIC | Age: 25
End: 2025-04-23
Payer: COMMERCIAL

## 2025-05-07 ENCOUNTER — MYC REFILL (OUTPATIENT)
Dept: FAMILY MEDICINE | Facility: CLINIC | Age: 25
End: 2025-05-07
Payer: COMMERCIAL

## 2025-05-07 DIAGNOSIS — F41.1 GAD (GENERALIZED ANXIETY DISORDER): ICD-10-CM

## 2025-05-07 DIAGNOSIS — R11.0 NAUSEA: ICD-10-CM

## 2025-05-07 RX ORDER — ONDANSETRON 4 MG/1
TABLET, FILM COATED ORAL
Qty: 30 TABLET | Refills: 0 | Status: SHIPPED | OUTPATIENT
Start: 2025-05-07

## 2025-05-07 RX ORDER — HYDROXYZINE HYDROCHLORIDE 10 MG/1
10 TABLET, FILM COATED ORAL 3 TIMES DAILY PRN
Qty: 60 TABLET | Refills: 1 | Status: SHIPPED | OUTPATIENT
Start: 2025-05-07

## 2025-08-04 ENCOUNTER — E-VISIT (OUTPATIENT)
Dept: URGENT CARE | Facility: CLINIC | Age: 25
End: 2025-08-04
Payer: COMMERCIAL

## 2025-08-04 DIAGNOSIS — R39.89 GENITAL SORE: Primary | ICD-10-CM

## 2025-08-04 PROCEDURE — 99207 PR NON-BILLABLE SERV PER CHARTING: CPT | Performed by: FAMILY MEDICINE

## 2025-08-05 ENCOUNTER — VIRTUAL VISIT (OUTPATIENT)
Dept: URGENT CARE | Facility: CLINIC | Age: 25
End: 2025-08-05
Payer: COMMERCIAL

## 2025-08-05 DIAGNOSIS — A60.04 HERPES SIMPLEX VULVOVAGINITIS: Primary | ICD-10-CM

## 2025-08-05 PROCEDURE — 98005 SYNCH AUDIO-VIDEO EST LOW 20: CPT

## 2025-08-05 RX ORDER — VALACYCLOVIR HYDROCHLORIDE 500 MG/1
500 TABLET, FILM COATED ORAL 2 TIMES DAILY
Qty: 6 TABLET | Refills: 1 | Status: SHIPPED | OUTPATIENT
Start: 2025-08-05 | End: 2025-08-08

## 2025-08-11 DIAGNOSIS — R11.0 NAUSEA: ICD-10-CM

## 2025-08-12 RX ORDER — ONDANSETRON 4 MG/1
TABLET, FILM COATED ORAL
Qty: 30 TABLET | Refills: 0 | Status: SHIPPED | OUTPATIENT
Start: 2025-08-12